# Patient Record
Sex: MALE | Race: WHITE | NOT HISPANIC OR LATINO | Employment: OTHER | URBAN - METROPOLITAN AREA
[De-identification: names, ages, dates, MRNs, and addresses within clinical notes are randomized per-mention and may not be internally consistent; named-entity substitution may affect disease eponyms.]

---

## 2018-09-08 ENCOUNTER — OFFICE VISIT (OUTPATIENT)
Dept: URGENT CARE | Facility: CLINIC | Age: 63
End: 2018-09-08
Payer: COMMERCIAL

## 2018-09-08 VITALS
WEIGHT: 206 LBS | TEMPERATURE: 97.8 F | BODY MASS INDEX: 27.9 KG/M2 | RESPIRATION RATE: 14 BRPM | HEIGHT: 72 IN | DIASTOLIC BLOOD PRESSURE: 80 MMHG | HEART RATE: 68 BPM | SYSTOLIC BLOOD PRESSURE: 128 MMHG | OXYGEN SATURATION: 98 %

## 2018-09-08 DIAGNOSIS — M62.838 NECK MUSCLE SPASM: Primary | ICD-10-CM

## 2018-09-08 PROBLEM — C80.1 CARCINOMA OF UNKNOWN PRIMARY (HCC): Status: ACTIVE | Noted: 2017-01-03

## 2018-09-08 PROBLEM — N40.0 ENLARGED PROSTATE: Status: ACTIVE | Noted: 2017-04-20

## 2018-09-08 PROBLEM — C77.0 SECONDARY MALIGNANT NEOPLASM OF LYMPH NODES OF NECK (HCC): Status: ACTIVE | Noted: 2017-01-03

## 2018-09-08 PROCEDURE — 99204 OFFICE O/P NEW MOD 45 MIN: CPT | Performed by: PHYSICIAN ASSISTANT

## 2018-09-08 RX ORDER — CYCLOBENZAPRINE HCL 5 MG
5-10 TABLET ORAL 3 TIMES DAILY PRN
Qty: 15 TAB | Refills: 0 | Status: SHIPPED | OUTPATIENT
Start: 2018-09-08 | End: 2018-09-13

## 2018-09-08 RX ORDER — METHYLPREDNISOLONE 4 MG/1
4 TABLET ORAL DAILY
Qty: 1 KIT | Refills: 0 | Status: SHIPPED | OUTPATIENT
Start: 2018-09-08 | End: 2018-09-25

## 2018-09-08 ASSESSMENT — ENCOUNTER SYMPTOMS
NAUSEA: 0
SHORTNESS OF BREATH: 0
FEVER: 0
CONSTIPATION: 0
COUGH: 0
DIARRHEA: 0
TINGLING: 0
NECK PAIN: 1
ABDOMINAL PAIN: 0
VOMITING: 0
CHILLS: 0

## 2018-09-08 NOTE — PROGRESS NOTES
Subjective:   Cesar Bernard is a 63 y.o. male who presents for Neck Pain (x2wks radiates to shoulder )    Neck Pain    This is a new problem. Episode onset: 2 weeks. The problem occurs constantly. The problem has been gradually worsening. The pain is present in the midline. The quality of the pain is described as shooting. Pertinent negatives include no fever or tingling. Treatments tried: prednisone 15mg QD x 3 (dogs rx) The treatment provided moderate relief.     Denies specific injury to neck but he is currently moving. He has been doing a lot of lifting and moving of boxes.    Pt had radiation for oropharynx cancer  in 2016, without complication.     Review of Systems   Constitutional: Negative for chills, fever and malaise/fatigue.   Respiratory: Negative for cough and shortness of breath.    Gastrointestinal: Negative for abdominal pain, constipation, diarrhea, nausea and vomiting.   Musculoskeletal: Positive for neck pain.   Neurological: Negative for tingling.   All other systems reviewed and are negative.      PMH:  has no past medical history on file.  MEDS:   Current Outpatient Prescriptions:   •  MethylPREDNISolone (MEDROL DOSEPAK) 4 MG Tablet Therapy Pack, Take 1 Tab by mouth every day., Disp: 1 Kit, Rfl: 0  •  cyclobenzaprine (FLEXERIL) 5 MG tablet, Take 1-2 Tabs by mouth 3 times a day as needed for up to 5 days., Disp: 15 Tab, Rfl: 0  ALLERGIES: No Known Allergies  SURGHX: No past surgical history on file.  SOCHX:  reports that he has never smoked. He has never used smokeless tobacco.  No family history on file.     Objective:   /80   Pulse 68   Temp 36.6 °C (97.8 °F)   Resp 14   Ht 1.829 m (6')   Wt 93.4 kg (206 lb)   SpO2 98%   BMI 27.94 kg/m²     Physical Exam   Constitutional: He is oriented to person, place, and time. He appears well-developed and well-nourished. No distress.   HENT:   Head: Normocephalic and atraumatic.   Eyes: Pupils are equal, round, and reactive to light.  Conjunctivae are normal.   Cardiovascular: Normal rate and regular rhythm.    Pulmonary/Chest: Effort normal and breath sounds normal.   Musculoskeletal:        Cervical back: He exhibits pain and spasm. He exhibits normal range of motion, no bony tenderness, no swelling, no edema and no deformity.   ROM, strength, tone intact bilateral upper extremity. N/v intact.    Neurological: He is alert and oriented to person, place, and time.   Skin: Skin is warm and dry.   Psychiatric: He has a normal mood and affect. His behavior is normal.   Vitals reviewed.        Assessment/Plan:     1. Neck muscle spasm  MethylPREDNISolone (MEDROL DOSEPAK) 4 MG Tablet Therapy Pack    cyclobenzaprine (FLEXERIL) 5 MG tablet    REFERRAL TO FOLLOW-UP WITH PRIMARY CARE       Pt directed to start neck stretches, handout provided. Continue supportive care measures. Given pt past medical hx pt directed to follow-up with primary care provider within 7-10 days, referral initiated. If sx worsen or persist consider PT. Red flags and emergency room precautions discussed.  Patient appears understanding of information.

## 2018-09-08 NOTE — PATIENT INSTRUCTIONS
Cervical Strain and Sprain Rehab  Ask your health care provider which exercises are safe for you. Do exercises exactly as told by your health care provider and adjust them as directed. It is normal to feel mild stretching, pulling, tightness, or discomfort as you do these exercises, but you should stop right away if you feel sudden pain or your pain gets worse. Do not begin these exercises until told by your health care provider.  Stretching and range of motion exercises  These exercises warm up your muscles and joints and improve the movement and flexibility of your neck. These exercises also help to relieve pain, numbness, and tingling.  Exercise A: Cervical side bend  1. Using good posture, sit on a stable chair or stand up.  2. Without moving your shoulders, slowly tilt your left / right ear to your shoulder until you feel a stretch in your neck muscles. You should be looking straight ahead.  3. Hold for __________ seconds.  4. Repeat with the other side of your neck.  Repeat __________ times. Complete this exercise __________ times a day.  Exercise B: Cervical rotation  1. Using good posture, sit on a stable chair or stand up.  2. Slowly turn your head to the side as if you are looking over your left / right shoulder.  ¨ Keep your eyes level with the ground.  ¨ Stop when you feel a stretch along the side and the back of your neck.  3. Hold for __________ seconds.  4. Repeat this by turning to your other side.  Repeat __________ times. Complete this exercise __________ times a day.  Exercise C: Thoracic extension and pectoral stretch  1. Roll a towel or a small blanket so it is about 4 inches (10 cm) in diameter.  2. Lie down on your back on a firm surface.  3. Put the towel lengthwise, under your spine in the middle of your back. It should not be not under your shoulder blades. The towel should line up with your spine from your middle back to your lower back.  4. Put your hands behind your head and let your  elbows fall out to your sides.  5. Hold for __________ seconds.  Repeat __________ times. Complete this exercise __________ times a day.  Strengthening exercises  These exercises build strength and endurance in your neck. Endurance is the ability to use your muscles for a long time, even after your muscles get tired.  Exercise D: Upper cervical flexion, isometric  1. Lie on your back with a thin pillow behind your head and a small rolled-up towel under your neck.  2. Gently tuck your chin toward your chest and nod your head down to look toward your feet. Do not lift your head off the pillow.  3. Hold for __________ seconds.  4. Release the tension slowly. Relax your neck muscles completely before you repeat this exercise.  Repeat __________ times. Complete this exercise __________ times a day.  Exercise E: Cervical extension, isometric  1. Stand about 6 inches (15 cm) away from a wall, with your back facing the wall.  2. Place a soft object, about 6-8 inches (15-20 cm) in diameter, between the back of your head and the wall. A soft object could be a small pillow, a ball, or a folded towel.  3. Gently tilt your head back and press into the soft object. Keep your jaw and forehead relaxed.  4. Hold for __________ seconds.  5. Release the tension slowly. Relax your neck muscles completely before you repeat this exercise.  Repeat __________ times. Complete this exercise __________ times a day.  Posture and body mechanics     Body mechanics refers to the movements and positions of your body while you do your daily activities. Posture is part of body mechanics. Good posture and healthy body mechanics can help to relieve stress in your body's tissues and joints. Good posture means that your spine is in its natural S-curve position (your spine is neutral), your shoulders are pulled back slightly, and your head is not tipped forward. The following are general guidelines for applying improved posture and body mechanics to your  everyday activities.  Standing  · When standing, keep your spine neutral and keep your feet about hip-width apart. Keep a slight bend in your knees. Your ears, shoulders, and hips should line up.  · When you do a task in which you  one place for a long time, place one foot up on a stable object that is 2-4 inches (5-10 cm) high, such as a footstool. This helps keep your spine neutral.  Sitting  · When sitting, keep your spine neutral and your keep feet flat on the floor. Use a footrest, if necessary, and keep your thighs parallel to the floor. Avoid rounding your shoulders, and avoid tilting your head forward.  · When working at a desk or a computer, keep your desk at a height where your hands are slightly lower than your elbows. Slide your chair under your desk so you are close enough to maintain good posture.  · When working at a computer, place your monitor at a height where you are looking straight ahead and you do not have to tilt your head forward or downward to look at the screen.  Resting  When lying down and resting, avoid positions that are most painful for you. Try to support your neck in a neutral position. You can use a contour pillow or a small rolled-up towel. Your pillow should support your neck but not push on it.  This information is not intended to replace advice given to you by your health care provider. Make sure you discuss any questions you have with your health care provider.  Document Released: 12/18/2006 Document Revised: 08/24/2017 Document Reviewed: 11/23/2016  [employer] Interactive Patient Education © 2017 [employer]

## 2018-09-25 ENCOUNTER — OFFICE VISIT (OUTPATIENT)
Dept: INTERNAL MEDICINE | Facility: MEDICAL CENTER | Age: 63
End: 2018-09-25
Payer: COMMERCIAL

## 2018-09-25 ENCOUNTER — HOSPITAL ENCOUNTER (OUTPATIENT)
Dept: RADIOLOGY | Facility: MEDICAL CENTER | Age: 63
End: 2018-09-25
Attending: STUDENT IN AN ORGANIZED HEALTH CARE EDUCATION/TRAINING PROGRAM
Payer: COMMERCIAL

## 2018-09-25 VITALS
HEIGHT: 72 IN | TEMPERATURE: 97.5 F | OXYGEN SATURATION: 98 % | DIASTOLIC BLOOD PRESSURE: 86 MMHG | SYSTOLIC BLOOD PRESSURE: 130 MMHG | HEART RATE: 59 BPM | WEIGHT: 203 LBS | BODY MASS INDEX: 27.5 KG/M2

## 2018-09-25 DIAGNOSIS — M54.2 NECK PAIN: ICD-10-CM

## 2018-09-25 DIAGNOSIS — Z00.00 HEALTH CARE MAINTENANCE: ICD-10-CM

## 2018-09-25 PROCEDURE — 99204 OFFICE O/P NEW MOD 45 MIN: CPT | Mod: GC | Performed by: INTERNAL MEDICINE

## 2018-09-25 PROCEDURE — 72040 X-RAY EXAM NECK SPINE 2-3 VW: CPT

## 2018-09-25 RX ORDER — TIZANIDINE 4 MG/1
4 TABLET ORAL
Qty: 90 TAB | Refills: 0 | Status: SHIPPED | OUTPATIENT
Start: 2018-09-25 | End: 2018-10-23

## 2018-09-25 RX ORDER — GABAPENTIN 300 MG/1
300 CAPSULE ORAL
Qty: 90 CAP | Refills: 0 | Status: SHIPPED | OUTPATIENT
Start: 2018-09-25 | End: 2018-10-23

## 2018-09-25 RX ORDER — PREDNISONE 20 MG/1
20 TABLET ORAL DAILY
Qty: 5 TAB | Refills: 0 | Status: CANCELLED | OUTPATIENT
Start: 2018-09-25

## 2018-09-25 ASSESSMENT — PATIENT HEALTH QUESTIONNAIRE - PHQ9: CLINICAL INTERPRETATION OF PHQ2 SCORE: 0

## 2018-10-01 ENCOUNTER — PHYSICAL THERAPY (OUTPATIENT)
Dept: PHYSICAL THERAPY | Facility: MEDICAL CENTER | Age: 63
End: 2018-10-01
Attending: STUDENT IN AN ORGANIZED HEALTH CARE EDUCATION/TRAINING PROGRAM
Payer: COMMERCIAL

## 2018-10-01 DIAGNOSIS — M54.2 NECK PAIN: ICD-10-CM

## 2018-10-01 PROCEDURE — 97161 PT EVAL LOW COMPLEX 20 MIN: CPT

## 2018-10-01 ASSESSMENT — ENCOUNTER SYMPTOMS
PAIN SCALE AT LOWEST: 0
ALLEVIATING FACTORS: POSITION CHANGE
ALLEVIATING FACTORS: PAIN MEDICATION
POSTURAL HEADACHE: 1
PAIN SCALE: 4
QUALITY: SHARP
PAIN TIMING: EVERY DAY
ALLEVIATING FACTORS: ACTIVITY MODIFICATION
ALLEVIATING FACTORS: NECK COLLAR
PAIN TIMING: INTERMITTENT
EXACERBATED BY: KEYBOARDING
PAIN TIMING: OCCASIONAL

## 2018-10-01 NOTE — OP THERAPY EVALUATION
Outpatient Physical Therapy  INITIAL EVALUATION    [facility] Outpatient Physical Therapy  [address]  [place] [address]  Phone:  [phone]  Fax:  [phone]    Date of Evaluation: 10/01/2018    Patient: Cesar Bernard  YOB: 1955  MRN: [id number]     Referring Provider: Carey Hoyt M.D.  [address] [place] [address], [place] [address]   Referring Diagnosis No admission diagnoses are documented for this encounter.     Time Calculation  Start time: 1415 (patient arrived late due to difficulty finding clinic)  Stop time: 1500 Time Calculation (min): 45 minutes     Physical Therapy Occurrence Codes    Date of onset of impairment:  9/3/18   Date physical therapy care plan established or reviewed:  10/1/18   Date physical therapy treatment started:  10/1/18          Chief Complaint: Neck Pain    Visit Diagnoses     ICD-10-CM   1. Neck pain M54.2         Subjective:   History of Present Illness:     Mechanism of injury:  Patient with h/o oropharynx cancer  in 2016 treated with radiation presents with 3-4 week history of R sided neck pain with radiation down neck to shoulder blade and at times accompanied by headaches. He attributes the onset to lifting boxes during his recent move to [place]. Denies n/t. Denies changes to bowel/bladder function.          Headaches:  postural headache  Headache comments: radiates up from neck/suboccipitals region  Sleep disturbance:  Not disrupted (not currently)  Pain:     Current pain ratin    At best pain ratin (in the morning upon waking)    Location:  R side neck from base of skull. Radiates at times to R upper back at level of inferior angle R scapula    Quality:  Sharp    Pain timing:  Intermittent, every day and occasional    Relieving factors:  Pain medication, neck collar, activity modification and position change (sitting in spa)    Aggravating factors:  Keyboarding (looking down to keyboard, to turn on  in the  morning)    Progression:  Improving    Pain Comments::  At onset, was extremely painful. The patient visited urgent care several weeks ago. Much improved currently, although symptoms are still occurring daily.  Social Support:     Lives in:  One-story house    Lives with:  Spouse  Hand dominance:  Right  Diagnostic Tests:     X-ray: normal      Abnormal MRI: The patient is scheduled for his quarterly full body follow up MRI in 2 weeks.      Diagnostic Tests Comments:  X-ray C-spine 9/25/2018  Impression   No acute fracture or malalignment.          Treatments:     Previous treatment:  Medication    Current treatment:  Cervical collar, activity modification and medication (advil)    Treatment Comments:  Cervical collar worn less frequently as symptoms have improved. Not worn to today's evaluation.      Activities of Daily Living:     Patient reported ADL status: Retired   Does spin class regularly, elliptical, walking, golfing    The patient continues to  perform neck and jaw ROM exercises at home that he was prescribed by PT at the time of his initial treatment for oropharynx cancer.  Patient Goals:     Patient goals for therapy:  Decreased pain      No past medical history on file.  No past surgical history on file.  Social History   Substance Use Topics   • Smoking status: Never Smoker   • Smokeless tobacco: Former User     Types: Chew   • Alcohol use Not on file     Family and Occupational History     Social History   • Marital status:      Spouse name: N/A   • Number of children: N/A   • Years of education: N/A       Objective     Postural Observations  Seated posture: fair  Standing posture: fair  Correction of posture: makes symptoms worse    Additional Postural Observation Details  Forward head, slightly elevated L shoulder, protracted scapula bilaterally    Shoulder Screen    Shoulder active range of motion within functional limits.  Shoulder strength within functional  limits.  Shoulder joint mobility within functional limits.    Neurological Testing     Reflexes   Left   Biceps (C5/C6): normal (2+)  Brachioradialis (C6): normal (2+)  Triceps (C7): normal (2+)    Right   Biceps (C5/C6): normal (2+)  Brachioradialis (C6): normal (2+)  Triceps (C7): normal (2+)    Myotome testing   Cervical (left)   All left cervical myotomes within normal limits    Cervical (right)   All right cervical myotomes within normal limits    Dermatome testing   Cervical (left)   All left cervical dermatomes intact    Cervical (right)   All right cervical dermatomes intact    Palpation   Left   No palpable tenderness to the cervical paraspinals.     Right   Tenderness of the cervical paraspinals.     Active Range of Motion     Cervical Spine   Flexion: decreased  Extension: decreased  Retraction: within functional limits (provokes symptoms)  Left lateral flexion: decreased  Right lateral flexion: decreased  Left rotation: within functional limits  Right rotation: within functional limits    Passive Range of Motion     Cervical spine: All cervical passive range of motion within functional limits    Joint Play   Spine     Central PA [place]        C0-1:  with grade 3       C2: WFL with grade 3       C3: WFL with grade 3       C4: WFL with grade 3       C5: WFL with grade 3       C6: WFL with grade 3       C7: WFL with grade 3       C8: WFL with grade 3    Unilateral PA Glide (left)        C2: WFL       C3: WFL       C4: WFL       C5: WFL       C6: WFL       C7: WFL       C8: WFL    Unilateral PA Glide (right)        C2: WFL       C3: WFL       C4: WFL       C5: WFL       C6: WFL       C7: WFL       C8: WFL        Strength:    Cervical Spine   Deep neck flexors: 3+  Deep neck extensors: 3+    Upper extremities   Left upper extremity strength within functional limits  Right upper extremity strength within functional limits    Left Shoulder   Isolated Muscles   Rhomboids: 5   Serratus anterior: 4- (resisted  testing provoked R sided neck pain)   Trapezius (lower): 3-   Trapezius (middle): 4- (Mid and upper trapezius testing provoked thoracic paraspinal fatigue/mild cramping)     Right Shoulder   Isolated Muscles   Rhomboids: 5   Serratus anterior: 4- (resisted testing provoked R sided neck pain)   Trapezius (lower): 3-   Trapezius (middle): 4- (Mid and upper trapezius testing provoked thoracic paraspinal fatigue/mild cramping)     Tests   Cervical spine   Negative cervical spine compression and cervical spine distraction.   Additional testing details: Negative cervical quadrant all directions    Left Shoulder   Negative Spurling's sign.     Right Shoulder   Negative Spurling's sign.         Therapeutic Exercises (CPT 04807):     1. Supine chin tucks to towel roll, 10 with 5 sec. hold, 10% force, 2 sets/day    2. Supine suboccipital stretch on tennis balls in sock, 2-5 min, 1-2 x per day      Therapeutic Exercise Summary: Fixational Access Code: [email or web address]           Time-based treatments/modalities:  Therapeutic exercise minutes (CPT 79900): 5 minutes       Assessment, Response and Plan:   Impairments: abnormal or restricted ROM, lacks appropriate home exercise program and pain with function    Other Impairments:  Impaired posture  Assessment details:  63 year old male with history notable for oropharynx cancer in 2016 treated with radiation presents with 4 week history of intermittent R posterior neck pain. Signs and symptoms are consistent with myofascial derangement of R upper trapezius, cervical paraspinals, suboccipitals in the setting of impaired posture and likely provoked by poor body mechanics with repetitive strain while lifting boxes during his recent move. All joint provocation tests at this exam were negative and neurological testing WNL. The patient will benefit from skilled PT to address the above impairments and restore baseline function. Expect that PT goals will be met in 8 weeks.  Barriers to therapy:   None  Prognosis: good    Goals:   Short Term Goals:   Able to perform cervical retraction/scapular depression in sitting posture without provocation of symptoms.   No pain with cervical spine AROM  Pain 0/10 with ADLs    Short term goal time span:  2-4 weeks      Long Term Goals:    NDI score: 0% impaired  Improved strength/endurance cervical/axial flexors and extensors  Pain 0/10 with recreation, reading  Long term goal time span:  6-8 weeks    Plan:   Therapy options:  Physical therapy treatment to continue  Planned therapy interventions:  Hot or Cold Pack Therapy (CPT 46572), Mechanical Traction (CPT 23809), Neuromuscular Re-education (CPT 45226), Therapeutic Activities (CPT 84081) and Therapeutic Exercise (CPT 72351)  Frequency:  2x week  Duration in weeks:  8  Discussed with:  Patient      Functional Limitation G-Codes and Severity Modifiers  Neck Disability Total: 13     Referring provider co-signature:  I have reviewed this plan of care and my co-signature certifies the need for services.  Certification Dates:   From 10/1/2018     To 11/30/2018    Physician Signature: ________________________________ Date: ______________

## 2018-10-05 ENCOUNTER — PHYSICAL THERAPY (OUTPATIENT)
Dept: PHYSICAL THERAPY | Facility: MEDICAL CENTER | Age: 63
End: 2018-10-05
Attending: STUDENT IN AN ORGANIZED HEALTH CARE EDUCATION/TRAINING PROGRAM
Payer: COMMERCIAL

## 2018-10-05 DIAGNOSIS — M54.2 NECK PAIN: ICD-10-CM

## 2018-10-05 PROCEDURE — 97110 THERAPEUTIC EXERCISES: CPT

## 2018-10-05 NOTE — OP THERAPY DAILY TREATMENT
Outpatient Physical Therapy  DAILY TREATMENT     [facility] Outpatient Physical Therapy  [address]  [place] [address]  Phone:  [phone]  Fax:  [phone]    Date: 10/05/2018    Patient: Cesar Bernard  YOB: 1955  MRN: [id number]     Time Calculation  Start time: 0300  Stop time: 0330 Time Calculation (min): 30 minutes     Chief Complaint: Neck Problem    Visit #: 2    SUBJECTIVE:  Symptoms have been less irritable. The tennis ball stretch gets rid of symptoms when he has a flare up.     OBJECTIVE:  Current objective measures:           Therapeutic Exercises (CPT 12729):     1. UBE 2 min fwd, 2 min bckwd    2. Supine cervical retraction to towel roll, 10 x 2    3. Supine chin tuck with cervical/axial flexion, 5 x 2    4. Unilateral horizontal UE abduction, [place] band, 10 ea. side x 1    5. Seated scapular adduction/retraction , 10 with 5 sec. hold x 1      Therapeutic Exercise Summary:           Time-based treatments/modalities:  Therapeutic exercise minutes (CPT 28128): 30 minutes       ASSESSMENT:   Response to treatment: Overuse of upper trapezius with UBE. Reported stretch in suboccipitals with cervical axial flexion exercise at today's session, but no flare up of symptoms.     PLAN/RECOMMENDATIONS:   Plan for treatment: therapy treatment to continue next visit.  Planned interventions for next visit: continue with current treatment.

## 2018-10-10 ENCOUNTER — PHYSICAL THERAPY (OUTPATIENT)
Dept: PHYSICAL THERAPY | Facility: MEDICAL CENTER | Age: 63
End: 2018-10-10
Attending: STUDENT IN AN ORGANIZED HEALTH CARE EDUCATION/TRAINING PROGRAM
Payer: COMMERCIAL

## 2018-10-10 DIAGNOSIS — M54.2 NECK PAIN: ICD-10-CM

## 2018-10-10 PROCEDURE — 97012 MECHANICAL TRACTION THERAPY: CPT

## 2018-10-12 ENCOUNTER — PHYSICAL THERAPY (OUTPATIENT)
Dept: PHYSICAL THERAPY | Facility: MEDICAL CENTER | Age: 63
End: 2018-10-12
Attending: STUDENT IN AN ORGANIZED HEALTH CARE EDUCATION/TRAINING PROGRAM
Payer: COMMERCIAL

## 2018-10-12 DIAGNOSIS — M54.2 NECK PAIN: ICD-10-CM

## 2018-10-12 PROCEDURE — 97140 MANUAL THERAPY 1/> REGIONS: CPT

## 2018-10-12 PROCEDURE — 97530 THERAPEUTIC ACTIVITIES: CPT

## 2018-10-12 NOTE — OP THERAPY DAILY TREATMENT
Outpatient Physical Therapy  DAILY TREATMENT     [facility] Outpatient Physical Therapy  [address]  [place] [address]  Phone:  [phone]  Fax:  [phone]    Date: 10/12/2018    Patient: Cesar Bernard  YOB: 1955  MRN: [id number]     Time Calculation  Start time: 1030  Stop time: 1100 Time Calculation (min): 30 minutes     Chief Complaint: Neck Problem    Visit #: 4    SUBJECTIVE:  Symptom provocation primarily when cooking and when carrying a heavy shopping basket in R hand.     OBJECTIVE:  Current objective measures          Therapeutic Exercises (CPT 24108):       Therapeutic Exercise Summary:         Therapeutic Treatments and Modalities:     1. Manual Therapy (CPT 83921), Suboccipital release, PROM Cervical ROT, Lateral flexion R, STM R upper trapezius, cervical paraspinals, AAROM Chin retraction , 15 min    2. Therapeutic Activities (CPT 56697), Functional training - ergonomics and body mechanics for cooking, chopping set-up, 15 min    Time-based treatments/modalities:  Manual therapy minutes (CPT 34834): 15 minutes  Therapeutic activity minutes (CPT 23260): 15 minutes     Patient education: Patient instructed to trial more ergonomic setup for cooking, as practiced in clinic today.    Pain rating before treatment: 0  Pain rating after treatment: 0    ASSESSMENT:   Response to treatment: Patient reported mild sensation of tightness in R side neck with repeated PROM lateral flexion, which eased with repetition.    PLAN/RECOMMENDATIONS:   Plan for treatment: therapy treatment to continue next visit.  Planned interventions for next visit: continue with current treatment. Review and progress HEP.

## 2018-10-15 ENCOUNTER — APPOINTMENT (OUTPATIENT)
Dept: PHYSICAL THERAPY | Facility: MEDICAL CENTER | Age: 63
End: 2018-10-15
Attending: STUDENT IN AN ORGANIZED HEALTH CARE EDUCATION/TRAINING PROGRAM
Payer: COMMERCIAL

## 2018-10-19 ENCOUNTER — PHYSICAL THERAPY (OUTPATIENT)
Dept: PHYSICAL THERAPY | Facility: MEDICAL CENTER | Age: 63
End: 2018-10-19
Attending: STUDENT IN AN ORGANIZED HEALTH CARE EDUCATION/TRAINING PROGRAM
Payer: COMMERCIAL

## 2018-10-19 DIAGNOSIS — M54.2 NECK PAIN: ICD-10-CM

## 2018-10-19 PROCEDURE — 97110 THERAPEUTIC EXERCISES: CPT

## 2018-10-19 NOTE — OP THERAPY DAILY TREATMENT
Outpatient Physical Therapy  DAILY TREATMENT     [facility] Outpatient Physical Therapy  [address]  [place] [address]  Phone:  [phone]  Fax:  [phone]    Date: 10/19/2018    Patient: Cesar Bernard  YOB: 1955  MRN: [id number]     Time Calculation  Start time: 1405  Stop time: 1435 Time Calculation (min): 30 minutes     Chief Complaint: No chief complaint on file.    Visit #: 5    SUBJECTIVE:  The patient reports that he had an cervical spine MRI during his recent full body scan. He reports disc bulge at C6-7. Overall, feels that symptoms have improved since 3-4 weeks ago. No longer needs advil, pain levels are lower. Worst pain in last week 4-5/10.   OBJECTIVE:  Current objective measures:           Therapeutic Exercises (CPT 18071):     1. On foam roller, Prayer/ER shoulder stretch/AROM, Flashers with pink band, Bilateral flexion in neutral abduction, Alternating flexion, [place]    6. At [place], [place], HEP      Time-based treatments/modalities:  Therapeutic exercise minutes (CPT 49570): 30 minutes       Pain rating before treatment: 0  Pain rating after treatment: 0    ASSESSMENT:   Response to treatment: Improving ability to engage periscapular musculature appropriately, without overuse of upper trapezius. No symptom provocation with treatment.     PLAN/RECOMMENDATIONS:   Plan for treatment: therapy treatment to continue next visit.  Planned interventions for next visit: continue with current treatment. Discharge at next visit. Patient leaving for 2 months to travel. Foam roller HEP.

## 2018-10-23 ENCOUNTER — OFFICE VISIT (OUTPATIENT)
Dept: INTERNAL MEDICINE | Facility: MEDICAL CENTER | Age: 63
End: 2018-10-23
Payer: COMMERCIAL

## 2018-10-23 VITALS
OXYGEN SATURATION: 98 % | BODY MASS INDEX: 27.77 KG/M2 | TEMPERATURE: 97.8 F | SYSTOLIC BLOOD PRESSURE: 132 MMHG | WEIGHT: 205 LBS | DIASTOLIC BLOOD PRESSURE: 80 MMHG | HEIGHT: 72 IN | HEART RATE: 70 BPM

## 2018-10-23 DIAGNOSIS — E03.9 HYPOTHYROIDISM, UNSPECIFIED TYPE: ICD-10-CM

## 2018-10-23 DIAGNOSIS — Z71.84 TRAVEL ADVICE ENCOUNTER: ICD-10-CM

## 2018-10-23 PROCEDURE — 99213 OFFICE O/P EST LOW 20 MIN: CPT | Mod: GE | Performed by: INTERNAL MEDICINE

## 2018-10-23 RX ORDER — IMIQUIMOD 37.5 MG/G
CREAM TOPICAL
Refills: 4 | COMMUNITY
Start: 2018-10-16

## 2018-10-23 RX ORDER — AZITHROMYCIN 250 MG/1
TABLET, FILM COATED ORAL
Qty: 6 TAB | Refills: 0 | Status: SHIPPED | OUTPATIENT
Start: 2018-10-23 | End: 2019-04-23

## 2018-10-23 NOTE — PROGRESS NOTES
Established Patient    Cesar presents today with the following:    CC:   Chief Complaint   Patient presents with   • Labs Only     Lab Work Review       HPI:   The patient is a 63 year old male with past medical history of oropharyngeal cancer status post chemoradiation therapy, neck pain presented to the clinic for follow-up visit.  -He reports he recently went to [facility] for regular follow-up with his ENT and had a CAT scan, MRI which showed he is in remission.  He also had TSH testing which was abnormal-5.16 and was concerned as he underwent radiation to his neck  -He denies any symptoms of thyroid dysfunction: Heat or cold intolerance, constipation or diarrhea, hair loss, dry skin.  He reports he has symptoms of fatigue, low energy and falls asleep early.  -He also stated he went to 5 sessions of physical therapy to his neck and reports near complete resolution of symptoms.  He is also doing those exercises at home.  -He denies any use of tizanidine or gabapentin for similar pain anymore.  -He is traveling to [place] next month and would return in January, is going for vacation.    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/2017   • Cancer associated pain 03/01/2016   • Fever and other physiologic disturbances of temperature regulation 03/01/2016   • History of ongoing treatment with high-risk medication 02/25/2016   • Skin changes related to chemotherapy 02/25/2016   • Oropharynx cancer (HCC) 01/28/2016   • Hearing loss 01/28/2016   • Pulmonary nodules 01/28/2016   • Renal cyst 01/28/2016   • Tobacco chew use 01/28/2016   • Cancer of base of tongue (HCC) 12/28/2015   • 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        ROS: As per HPI. Additional pertinent symptoms as noted below.    All others negative    /80 (BP Location: Right arm, Patient Position: Sitting, BP Cuff Size: Adult)   Pulse 70   Temp 36.6 °C (97.8 °F) (Temporal)   Ht 1.816 m (5' 11.5\")   Wt 93 kg (205 lb)   SpO2 98%   BMI 28.19 kg/m²     Physical Exam   Constitutional:  oriented to person, place, and time. No distress.   Eyes: Pupils are equal, round, and reactive to light. No scleral icterus.  Neck: Neck supple. No thyromegaly present.   Cardiovascular: Normal rate, regular rhythm and normal heart sounds.  Exam reveals no gallop and no friction rub.  No murmur heard.  Pulmonary/Chest: Breath sounds normal. Chest wall is not dull to percussion.   Musculoskeletal:   no edema.   Lymphadenopathy: no cervical adenopathy  Neurological: alert and oriented to person, place, and time.   Skin: No cyanosis. Nails show no clubbing.    Note: I have reviewed all pertinent labs and diagnostic tests associated with this visit with specific comments listed under the assessment and plan below    Assessment and Plan    1. Hypothyroidism, unspecified type  -Patient underwent CT/MRI of the neck at [facility] recently for follow up of his oral cancer post treatment, at that time he also had TSH done which was 5.16 (nl: up to 4)  -Denies any symptoms of thyroid dysfunction  -On exam : no thyromegaly.  -Patient is advised to get repeat labs in 4-6 weeks -TSH ,FT4 and will consider medications if TSH is more than 10 or has thyroid symptoms.    2. Travel advice encounter  -Patient is travelling to [place] for three months from November 3 2018- January 2019  -Patient is up to date with vaccinations  -Azithromycin for traveller's diarrhea is given to the patient.  -Patient is also advised to use ORT " packets if symptoms of diarrhea develop during the travel.      Followup: Return in about 4 months (around 2/23/2019).      Signed by: Carey Hoyt M.D.

## 2018-10-29 RX ORDER — EPINEPHRINE 0.3 MG/.3ML
0.3 INJECTION SUBCUTANEOUS ONCE
Qty: 0.3 ML | Refills: 0 | Status: SHIPPED | OUTPATIENT
Start: 2018-10-29 | End: 2018-10-29

## 2019-04-19 ENCOUNTER — TELEPHONE (OUTPATIENT)
Dept: MEDICAL GROUP | Facility: LAB | Age: 64
End: 2019-04-19

## 2019-04-19 NOTE — TELEPHONE ENCOUNTER
NEW PATIENT VISIT PRE-VISIT PLANNING    1.  E[employer] Patient is checked in Patient Demographics? YES    2.  Immunizations were updated in Epic using WebIZ?: No WebIZ record       •  Web Iz Recommendations:     3.  Is this appointment scheduled as a Hospital Follow-Up? No    4.  Patient is due for the following Health Maintenance Topics:   Health Maintenance Due   Topic Date Due   • COLONOSCOPY  07/28/2007   • IMM ZOSTER VACCINES (1 of 2) 07/28/2007           5. Orders for overdue Health Maintenance topics pended in Pre-Charting? NO    6.  Reviewed/Updated the following with patient:   •   Preferred Pharmacy? NO       •   Preferred Lab? NO       •   Preferred Communication? NO       •   Allergies? NO       •   Medications? NO       •   Social History? NO       •   Family History (document living status of immediate family members and if + hx of cancer, diabetes, hypertension, hyperlipidemia, heart attack, stroke) NO    7.  Updated Care Team?       •   DME Company (gait device, O2, CPAP, etc.) NO       •   Other Specialists (eye doctor, derm, GYN, cardiology, endo, etc): NO    8.  Patient was informed to arrive 15 min prior to their   scheduled appointment and bring in their medication bottles.

## 2019-04-23 ENCOUNTER — OFFICE VISIT (OUTPATIENT)
Dept: MEDICAL GROUP | Facility: LAB | Age: 64
End: 2019-04-23
Payer: COMMERCIAL

## 2019-04-23 VITALS
BODY MASS INDEX: 27.63 KG/M2 | OXYGEN SATURATION: 95 % | HEIGHT: 72 IN | RESPIRATION RATE: 20 BRPM | HEART RATE: 66 BPM | WEIGHT: 204 LBS | DIASTOLIC BLOOD PRESSURE: 72 MMHG | SYSTOLIC BLOOD PRESSURE: 116 MMHG | TEMPERATURE: 98.8 F

## 2019-04-23 DIAGNOSIS — N40.0 ENLARGED PROSTATE: ICD-10-CM

## 2019-04-23 DIAGNOSIS — T45.1X5A SKIN CHANGES RELATED TO CHEMOTHERAPY: ICD-10-CM

## 2019-04-23 DIAGNOSIS — H91.93 BILATERAL HEARING LOSS, UNSPECIFIED HEARING LOSS TYPE: ICD-10-CM

## 2019-04-23 DIAGNOSIS — E78.49 OTHER HYPERLIPIDEMIA: ICD-10-CM

## 2019-04-23 DIAGNOSIS — I25.10 CORONARY ARTERY CALCIFICATION OF NATIVE ARTERY: ICD-10-CM

## 2019-04-23 DIAGNOSIS — C77.0 METASTASIS TO HEAD AND NECK LYMPH NODE (HCC): ICD-10-CM

## 2019-04-23 DIAGNOSIS — R91.8 PULMONARY NODULES: ICD-10-CM

## 2019-04-23 DIAGNOSIS — R23.9 SKIN CHANGES RELATED TO CHEMOTHERAPY: ICD-10-CM

## 2019-04-23 DIAGNOSIS — C01 CANCER OF BASE OF TONGUE (HCC): ICD-10-CM

## 2019-04-23 DIAGNOSIS — I25.84 CORONARY ARTERY CALCIFICATION OF NATIVE ARTERY: ICD-10-CM

## 2019-04-23 DIAGNOSIS — D72.819 LEUKOPENIA, UNSPECIFIED TYPE: ICD-10-CM

## 2019-04-23 DIAGNOSIS — C77.0 SECONDARY MALIGNANT NEOPLASM OF LYMPH NODES OF NECK (HCC): ICD-10-CM

## 2019-04-23 DIAGNOSIS — I65.23 BILATERAL CAROTID ARTERY STENOSIS: ICD-10-CM

## 2019-04-23 PROBLEM — C80.1 CARCINOMA OF UNKNOWN PRIMARY (HCC): Status: RESOLVED | Noted: 2017-01-03 | Resolved: 2019-04-23

## 2019-04-23 PROCEDURE — 99204 OFFICE O/P NEW MOD 45 MIN: CPT | Performed by: FAMILY MEDICINE

## 2019-04-23 RX ORDER — ROSUVASTATIN CALCIUM 5 MG/1
5 TABLET, COATED ORAL DAILY
COMMUNITY
Start: 2019-04-11 | End: 2019-05-29

## 2019-04-23 RX ORDER — EZETIMIBE 10 MG/1
10 TABLET ORAL DAILY
COMMUNITY
Start: 2019-04-15

## 2019-04-23 RX ORDER — LEVOTHYROXINE SODIUM 75 MCG
75 TABLET ORAL
COMMUNITY
Start: 2019-04-01

## 2019-04-23 ASSESSMENT — PATIENT HEALTH QUESTIONNAIRE - PHQ9: CLINICAL INTERPRETATION OF PHQ2 SCORE: 0

## 2019-04-23 NOTE — ASSESSMENT & PLAN NOTE
Seen at [facility] every 6 months.  Diagnosis was 3 years ago. Did radiation and Cetux.  Had some hearing loss from this.  He follows at [facility] every 6 months for this.

## 2019-04-23 NOTE — PATIENT INSTRUCTIONS
Cardiac Nuclear Scanning  A cardiac nuclear scan is used to check your heart for problems, such as the following:  · A portion of the heart is not getting enough blood.  · Part of the heart muscle has , which happens with a heart attack.  · The heart wall is not working normally.    In this test, a radioactive dye (tracer) is injected into your bloodstream. After the tracer has traveled to your heart, a scanning device is used to measure how much of the tracer is absorbed by or distributed to various areas of your heart.  LET YOUR HEALTH CARE PROVIDER KNOW ABOUT:  · Any allergies you have.  · All medicines you are taking, including vitamins, herbs, eye drops, creams, and over-the-counter medicines.  · Previous problems you or members of your family have had with the use of anesthetics.  · Any blood disorders you have.  · Previous surgeries you have had.  · Medical conditions you have.    RISKS AND COMPLICATIONS  Generally, this is a safe procedure. However, as with any procedure, problems can occur. Possible problems include:   · Serious chest pain.  · Rapid heartbeat.  · Sensation of warmth in your chest. This usually passes quickly.  BEFORE THE PROCEDURE  Ask your health care provider about changing or stopping your regular medicines.  PROCEDURE  This procedure is usually done at a hospital and takes 2-4 hours.  · An IV tube is inserted into one of your veins.  · Your health care provider will inject a small amount of radioactive tracer through the tube.  · You will then wait for 20-40 minutes while the tracer travels through your bloodstream.  · You will lie down on an exam table so images of your heart can be taken. Images will be taken for about 15-20 minutes.  · You will exercise on a treadmill or stationary bike. While you exercise, your heart activity will be monitored with an electrocardiogram (ECG), and your blood pressure will be checked.  · If you are unable to exercise, you may be given a medicine  to make your heart beat faster.  · When blood flow to your heart has peaked, tracer will again be injected through the IV tube.  · After 20-40 minutes, you will get back on the exam table and have more images taken of your heart.  · When the procedure is over, your IV tube will be removed.  AFTER THE PROCEDURE  · You will likely be able to leave shortly after the test. Unless your health care provider tells you otherwise, you may return to your normal schedule, including diet, activities, and medicines.  · Make sure you find out how and when you will get your test results.  This information is not intended to replace advice given to you by your health care provider. Make sure you discuss any questions you have with your health care provider.  Document Released: 01/12/2006 Document Revised: 12/23/2014 Document Reviewed: 11/26/2014  [employer] Interactive Patient Education © 2017 [employer]    Hypothyroidism  Hypothyroidism is a disorder of the thyroid. The thyroid is a large gland that is located in the lower front of the neck. The thyroid releases hormones that control how the body works. With hypothyroidism, the thyroid does not make enough of these hormones.  What are the causes?  Causes of hypothyroidism may include:  · Viral infections.  · Pregnancy.  · Your own defense system (immune system) attacking your thyroid.  · Certain medicines.  · Birth defects.  · Past radiation treatments to your head or neck.  · Past treatment with radioactive iodine.  · Past surgical removal of part or all of your thyroid.  · Problems with the gland that is located in the center of your brain (pituitary).  What are the signs or symptoms?  Signs and symptoms of hypothyroidism may include:  · Feeling as though you have no energy (lethargy).  · Inability to tolerate cold.  · Weight gain that is not explained by a change in diet or exercise habits.  · Dry skin.  · Coarse hair.  · Menstrual irregularity.  · Slowing of thought  processes.  · Constipation.  · Sadness or depression.  How is this diagnosed?  Your health care provider may diagnose hypothyroidism with blood tests and ultrasound tests.  How is this treated?  Hypothyroidism is treated with medicine that replaces the hormones that your body does not make. After you begin treatment, it may take several weeks for symptoms to go away.  Follow these instructions at home:  · Take medicines only as directed by your health care provider.  · If you start taking any new medicines, tell your health care provider.  · Keep all follow-up visits as directed by your health care provider. This is important. As your condition improves, your dosage needs may change. You will need to have blood tests regularly so that your health care provider can watch your condition.  Contact a health care provider if:  · Your symptoms do not get better with treatment.  · You are taking thyroid replacement medicine and:  ¨ You sweat excessively.  ¨ You have tremors.  ¨ You feel anxious.  ¨ You lose weight rapidly.  ¨ You cannot tolerate heat.  ¨ You have emotional swings.  ¨ You have diarrhea.  ¨ You feel weak.  Get help right away if:  · You develop chest pain.  · You develop an irregular heartbeat.  · You develop a rapid heartbeat.  This information is not intended to replace advice given to you by your health care provider. Make sure you discuss any questions you have with your health care provider.  Document Released: 12/18/2006 Document Revised: 05/25/2017 Document Reviewed: 05/05/2015  [employer] Interactive Patient Education © 2017 [employer]  Rosuvastatin Tablets  What is this medicine?  ROSUVASTATIN (anais ELINOR va sta tin) is known as a HMG-CoA reductase inhibitor or 'statin'. It lowers cholesterol and triglycerides in the blood. This drug may also reduce the risk of heart attack, stroke, or other health problems in patients with risk factors for heart disease. Diet and lifestyle changes are often used with  this drug.  This medicine may be used for other purposes; ask your health care provider or pharmacist if you have questions.  COMMON BRAND NAME(S): Crestor  What should I tell my health care provider before I take this medicine?  They need to know if you have any of these conditions:  -frequently drink alcoholic beverages  -kidney disease  -liver disease  -muscle aches or weakness  -other medical condition  -an unusual or allergic reaction to rosuvastatin, other medicines, foods, dyes, or preservatives  -pregnant or trying to get pregnant  -breast-feeding  How should I use this medicine?  Take this medicine by mouth with a glass of water. Follow the directions on the prescription label. Do not cut, crush or chew this medicine. You can take this medicine with or without food. Take your doses at regular intervals. Do not take your medicine more often than directed.  Talk to your pediatrician regarding the use of this medicine in children. While this drug may be prescribed for children as young as 7 years old for selected conditions, precautions do apply.  Overdosage: If you think you have taken too much of this medicine contact a poison control center or emergency room at once.  NOTE: This medicine is only for you. Do not share this medicine with others.  What if I miss a dose?  If you miss a dose, take it as soon as you can. Do not take 2 doses within 12 hours of each other. If there are less than 12 hours until your next dose, take only that dose. Do not take double or extra doses.  What may interact with this medicine?  Do not take this medicine with any of the following medications:  -herbal medicines like red yeast rice  This medicine may also interact with the following medications:  -alcohol  -antacids containing aluminum hydroxide or magnesium hydroxide  -cyclosporine  -other medicines for high cholesterol  -some medicines for HIV infection  -warfarin  This list may not describe all possible interactions. Give  your health care provider a list of all the medicines, herbs, non-prescription drugs, or dietary supplements you use. Also tell them if you smoke, drink alcohol, or use illegal drugs. Some items may interact with your medicine.  What should I watch for while using this medicine?  Visit your doctor or health care professional for regular check-ups. You may need regular tests to make sure your liver is working properly.  Tell your doctor or health care professional right away if you get any unexplained muscle pain, tenderness, or weakness, especially if you also have a fever and tiredness. Your doctor or health care professional may tell you to stop taking this medicine if you develop muscle problems. If your muscle problems do not go away after stopping this medicine, contact your health care professional.  This medicine may affect blood sugar levels. If you have diabetes, check with your doctor or health care professional before you change your diet or the dose of your diabetic medicine.  Avoid taking antacids containing aluminum, calcium or magnesium within 2 hours of taking this medicine.  This drug is only part of a total heart-health program. Your doctor or a dietician can suggest a low-cholesterol and low-fat diet to help. Avoid alcohol and smoking, and keep a proper exercise schedule.  Do not use this drug if you are pregnant or breast-feeding. Serious side effects to an unborn child or to an infant are possible. Talk to your doctor or pharmacist for more information.  What side effects may I notice from receiving this medicine?  Side effects that you should report to your doctor or health care professional as soon as possible:  -allergic reactions like skin rash, itching or hives, swelling of the face, lips, or tongue  -dark urine  -fever  -joint pain  -muscle cramps, pain  -redness, blistering, peeling or loosening of the skin, including inside the mouth  -trouble passing urine or change in the amount of  urine  -unusually weak or tired  -yellowing of the eyes or skin  Side effects that usually do not require medical attention (report to your doctor or health care professional if they continue or are bothersome):  -constipation  -heartburn  -nausea  -stomach gas, pain, upset  This list may not describe all possible side effects. Call your doctor for medical advice about side effects. You may report side effects to FDA at 9-730-YQH-7533.  Where should I keep my medicine?  Keep out of the reach of children.  Store at room temperature between 20 and 25 degrees C (68 and 77 degrees F). Keep container tightly closed (protect from moisture). Throw away any unused medicine after the expiration date.  NOTE: This sheet is a summary. It may not cover all possible information. If you have questions about this medicine, talk to your doctor, pharmacist, or health care provider.  © 2018 [employer] (2016-06-02 13:33:08)

## 2019-04-25 NOTE — ASSESSMENT & PLAN NOTE
Patient had a recent carotid artery study at [facility] that showed the following:  Right:  Mild heterogeneous plaque identified within the proximal ICA with no significant stenosis  by velocity criteria (0-50% stenosis).  The vertebral artery has antegrade blood flow.  The subclavian artery is normal.    Left:  Mild heterogeneous plaque identified within the proximal ICA with no significant stenosis  by velocity criteria (0-50% stenosis).  The vertebral artery has antegrade blood flow.  The subclavian artery is normal.

## 2019-04-25 NOTE — ASSESSMENT & PLAN NOTE
CT scan of the coronary arteries showed a coronary calcium score in the LAD of 107 the RCA of 14 for a total of 121.  Patient has not established with a cardiologist in [place] yet.

## 2019-04-25 NOTE — ASSESSMENT & PLAN NOTE
Patient's recent lab test he had a white blood cell count of 2.47 which is lower than it has been.  He denies any recurrent infections.

## 2019-04-25 NOTE — PROGRESS NOTES
Chief Complaint   Patient presents with   • Establish Care         Cesar Bernard is a 63 y.o. male here to establish care and for evaluation and management of:        HPI:    Cancer of base of tongue (HCC)  Seen at [facility] every 6 months.  Diagnosis was 3 years ago. Did radiation and Cetux.  Had some hearing loss from this.  He follows at [facility] every 6 months for this.    Hearing loss  Left greater than right.  Uses hearing aids.    Bilateral carotid artery stenosis  Patient had a recent carotid artery study at [facility] that showed the following:  Right:  Mild heterogeneous plaque identified within the proximal ICA with no significant stenosis  by velocity criteria (0-50% stenosis).  The vertebral artery has antegrade blood flow.  The subclavian artery is normal.    Left:  Mild heterogeneous plaque identified within the proximal ICA with no significant stenosis  by velocity criteria (0-50% stenosis).  The vertebral artery has antegrade blood flow.  The subclavian artery is normal.    Coronary artery calcification of native artery  CT scan of the coronary arteries showed a coronary calcium score in the LAD of 107 the RCA of 14 for a total of 121.  Patient has not established with a cardiologist in [place] yet.    Other hyperlipidemia  Patient was just started on rosuvastatin 5 mg daily and Zetia 10 mg daily by his PCP in [place].  He had blood work at [facility] on 4/1/2019 that showed a total cholesterol of 212 and LDL of 153 and HDL of 39 triglycerides 113.  His Lisa protein B was 128.  LDL-P was 2731 is apo protein A1 was 128.5. LpPLA2 Activity showed increased risk at 238.Genetic testing showed a markedly decreased response to clopidogrel.  He had a single copy of the MTHFR 677(C/T) all and 1298 (A/C) genotype    Pulmonary nodules  Patient has a known pulmonary nodule in the subpleural area of the lingula and right middle lobes.  These have been stable and are being followed by  "[facility].    Leukopenia  Patient's recent lab test he had a white blood cell count of 2.47 which is lower than it has been.  He denies any recurrent infections.      Allergies   Allergen Reactions   • Bee Venom Itching, Rash and Shortness of Breath   • Mangifera Indica Anaphylaxis     Other reaction(s): Other (Specify with Comments)  \"deathly allergic\" per pt   • Pistachio Nut Extract Skin Test Anaphylaxis   • Extract Of Poison Ivy Itching and Rash   • Other Misc Rash     Tumble Weed; itchy       Current medicines (including changes today)  Current Outpatient Prescriptions   Medication Sig Dispense Refill   • SYNTHROID 75 MCG Tab      • ezetimibe (ZETIA) 10 MG Tab      • rosuvastatin (CRESTOR) 5 MG Tab      • ZYCLARA PUMP 3.75 % Cream   4     No current facility-administered medications for this visit.      He  has a past medical history of Allergy; Asthma; Hearing loss; Hyperlipidemia; Thyroid disease; and tongue cancer (2016).  He  has a past surgical history that includes hernia repair; laminotomy (1997); tonsillectomy and adenoidectomy; dupuytren contracture release; and other.  Social History   Substance Use Topics   • Smoking status: Never Smoker   • Smokeless tobacco: Former User     Types: Chew   • Alcohol use 1.8 oz/week     2 Glasses of wine, 1 Shots of liquor per week     Social History     Social History Narrative   • No narrative on file     Family History   Problem Relation Age of Onset   • Psychiatry Mother    • Hyperlipidemia Mother    • Cancer Father         Prostate cancer    • Cancer Paternal Grandfather         Prostate cancer    • Cancer Maternal Aunt         Ovarian cancer   • Colon Cancer Paternal Uncle         At age 50s   • Cancer Paternal Uncle    • Alcohol/Drug Brother      Family Status   Relation Status   • Mo Alive   • Fa    • Bro Alive   • MGMo    • MGFa    • PGMo    • PGFa    • MAunt    • PUnc    • Bro Alive   • Bro Alive " "        ROS  No fever or chills.  No nausea or vomiting.  No chest pain or palpitations.  No cough or SOB.  No pain with urination or hematuria.  No black or bloody stools.  All other systems reviewed and are negative     Objective:     /72 (BP Location: Left arm, Patient Position: Sitting, BP Cuff Size: Adult)   Pulse 66   Temp 37.1 °C (98.8 °F) (Temporal)   Resp 20   Ht 1.816 m (5' 11.5\")   Wt 92.5 kg (204 lb)   SpO2 95%  Body mass index is 28.06 kg/m².  Physical Exam:      Well developed, well nourished.  Alert, oriented in no acute distress.  Psych: Eye contact is good, speech goal directed, affect calm  Eyes: conjunctiva non-injected, sclera non-icteric.  Ears: Pinna normal. TM pearly gray.   Nose: Nares are patent.  Normal mucosa  Mouth: Oral mucous membranes pink and moist with no lesions.  Neck Supple.  No adenopathy or masses in the neck or supraclavicular regions. No thyromegaly  Lungs: clear to auscultation bilaterally with good excursion. No wheezes or rhonchi  CV: regular rate and rhythm. No murmur  Abdomen: soft, nontender, no masses or organomegaly.  No rebound or guarding  Ext: no edema, color normal, vascularity normal, temperature normal      Assessment and Plan:   The following treatment plan was discussed    1. Cancer of base of tongue (HCC)  This is a chronic medical condition that is currently stable  Patient is currently in remission and being followed by [facility].  Continue to monitor    2. 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  Continue to monitor    3. Bilateral hearing loss, unspecified hearing loss type  Patient has hearing aids that he only uses when he is in loud open spaces are where there is a lot of people.  Continue follow-up with audiology    4. Pulmonary nodules  These appear to be stable.  Follow-up with [facility] as scheduled    5. 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  Continue to monitor    6. Skin changes related to chemotherapy  This is a chronic medical condition that is currently stable      7. Coronary artery calcification of native artery  Refer to cardiology for further evaluation and treatment.  - REFERRAL TO CARDIOLOGY    8. Other hyperlipidemia  Continue Crestor and Zetia.  Recommend increasing Crestor if current dose is not effective.  Patient will see cardiology  - REFERRAL TO CARDIOLOGY    9. Enlarged prostate  Recheck PSA  - PROSTATE SPECIFIC AG SCREENING; Future  - TESTOSTERONE SERUM; Future    10. Leukopenia, unspecified type  Recheck CBC  - CBC WITH DIFFERENTIAL; Future    11. Bilateral carotid artery stenosis  Annual follow-up recommended.  Continue Crestor for primary prevention.      Records requested.    Any change or worsening of signs or symptoms, patient encouraged to follow-up or report to the emergency room for further evaluation. Patient understands and agrees.    Followup: Return in about 6 months (around 10/23/2019).

## 2019-04-25 NOTE — ASSESSMENT & PLAN NOTE
Patient was just started on rosuvastatin 5 mg daily and Zetia 10 mg daily by his PCP in [place].  He had blood work at [facility] on 4/1/2019 that showed a total cholesterol of 212 and LDL of 153 and HDL of 39 triglycerides 113.  His Lisa protein B was 128.  LDL-P was 2731 is apo protein A1 was 128.5. LpPLA2 Activity showed increased risk at 238.Genetic testing showed a markedly decreased response to clopidogrel.  He had a single copy of the MTHFR 677(C/T) all and 1298 (A/C) genotype

## 2019-04-30 LAB
BASOPHILS # BLD AUTO: 0 X10E3/UL (ref 0–0.2)
BASOPHILS NFR BLD AUTO: 1 %
EOSINOPHIL # BLD AUTO: 0.1 X10E3/UL (ref 0–0.4)
EOSINOPHIL NFR BLD AUTO: 3 %
ERYTHROCYTE [DISTWIDTH] IN BLOOD BY AUTOMATED COUNT: 14.2 % (ref 12.3–15.4)
HCT VFR BLD AUTO: 46.3 % (ref 37.5–51)
HGB BLD-MCNC: 15.6 G/DL (ref 13–17.7)
IMM GRANULOCYTES # BLD AUTO: 0 X10E3/UL (ref 0–0.1)
IMM GRANULOCYTES NFR BLD AUTO: 0 %
IMMATURE CELLS  115398: NORMAL
LYMPHOCYTES # BLD AUTO: 0.7 X10E3/UL (ref 0.7–3.1)
LYMPHOCYTES NFR BLD AUTO: 21 %
MCH RBC QN AUTO: 31.3 PG (ref 26.6–33)
MCHC RBC AUTO-ENTMCNC: 33.7 G/DL (ref 31.5–35.7)
MCV RBC AUTO: 93 FL (ref 79–97)
MONOCYTES # BLD AUTO: 0.4 X10E3/UL (ref 0.1–0.9)
MONOCYTES NFR BLD AUTO: 10 %
MORPHOLOGY BLD-IMP: NORMAL
NEUTROPHILS # BLD AUTO: 2.2 X10E3/UL (ref 1.4–7)
NEUTROPHILS NFR BLD AUTO: 65 %
NRBC BLD AUTO-RTO: NORMAL %
PLATELET # BLD AUTO: 179 X10E3/UL (ref 150–379)
PSA SERPL-MCNC: 1 NG/ML (ref 0–4)
RBC # BLD AUTO: 4.99 X10E6/UL (ref 4.14–5.8)
TESTOST SERPL-MCNC: 648 NG/DL (ref 264–916)
WBC # BLD AUTO: 3.4 X10E3/UL (ref 3.4–10.8)

## 2019-05-23 ENCOUNTER — TELEPHONE (OUTPATIENT)
Dept: MEDICAL GROUP | Facility: LAB | Age: 64
End: 2019-05-23

## 2019-05-23 NOTE — TELEPHONE ENCOUNTER
----- Message from Cesar Bernard sent at 5/23/2019 10:07 AM PDT -----  Regarding: Non-Urgent Medical Question  Contact: [phone]  Hello,  I wanted to see if I could make an appointment with Dr. Bar as soon as possible.  I am experiencing shortness of breath and continued dry cough for over 4 weeks now and it does not seem to be going away.  Thanks,    Jonah Bernard

## 2019-05-23 NOTE — TELEPHONE ENCOUNTER
Spoke with pt he has had a dry cough/ wheeze for about 4 weeks. He is leaving [place] today and heading back to [place]. I did make him an appt for Tuesday morning. I also explained to pt if the symptoms worsened to seek medical care at the nearest urgent care or ED.

## 2019-05-28 ENCOUNTER — OFFICE VISIT (OUTPATIENT)
Dept: MEDICAL GROUP | Facility: LAB | Age: 64
End: 2019-05-28
Payer: COMMERCIAL

## 2019-05-28 VITALS
TEMPERATURE: 98.1 F | SYSTOLIC BLOOD PRESSURE: 116 MMHG | DIASTOLIC BLOOD PRESSURE: 68 MMHG | RESPIRATION RATE: 20 BRPM | HEIGHT: 72 IN | WEIGHT: 207.23 LBS | HEART RATE: 81 BPM | OXYGEN SATURATION: 93 % | BODY MASS INDEX: 28.07 KG/M2

## 2019-05-28 DIAGNOSIS — J30.1 SEASONAL ALLERGIC RHINITIS DUE TO POLLEN: ICD-10-CM

## 2019-05-28 DIAGNOSIS — R41.3 FUNCTIONAL MEMORY PROBLEM: ICD-10-CM

## 2019-05-28 DIAGNOSIS — I25.84 CORONARY ARTERY CALCIFICATION OF NATIVE ARTERY: ICD-10-CM

## 2019-05-28 DIAGNOSIS — R05.9 COUGH: ICD-10-CM

## 2019-05-28 DIAGNOSIS — R06.02 SOB (SHORTNESS OF BREATH) ON EXERTION: ICD-10-CM

## 2019-05-28 DIAGNOSIS — C01 CANCER OF BASE OF TONGUE (HCC): ICD-10-CM

## 2019-05-28 DIAGNOSIS — I25.10 CORONARY ARTERY CALCIFICATION OF NATIVE ARTERY: ICD-10-CM

## 2019-05-28 PROCEDURE — 99214 OFFICE O/P EST MOD 30 MIN: CPT | Performed by: FAMILY MEDICINE

## 2019-05-28 NOTE — LETTER
[employer]  Didi Bar M.D.  [address]  [place] [address]  Fax: [phone]   Authorization for Release/Disclosure of   Protected Health Information   Name: JOSE JUAN MEDINA : 1955 SSN: [id number]   Address: [address] [place] [address]  [place] [address] Phone:    [phone] (home)    I authorize the entity listed below to release/disclose the PHI below to:   [facility]/Didi Bar M.D. and Didi Bar M.D.   Provider or Entity Name:  Dr. Enamorado    Address   [facility]   Phone:      Fax:     Reason for request: continuity of care   Information to be released:    [  ] LAST COLONOSCOPY,  including any PATH REPORT and follow-up  [  ] LAST FIT/COLOGUARD RESULT [  ] LAST DEXA  [  ] LAST MAMMOGRAM  [  ] LAST PAP  [x ] LAST LABS [  ] RETINA EXAM REPORT  [  ] IMMUNIZATION RECORDS  [  ] Release all info      [  ] Check here and initial the line next to each item to release ALL health information INCLUDING  _____ Care and treatment for drug and / or alcohol abuse  _____ HIV testing, infection status, or AIDS  _____ Genetic Testing    DATES OF SERVICE OR TIME PERIOD TO BE DISCLOSED: _____________  I understand and acknowledge that:  * This Authorization may be revoked at any time by you in writing, except if your health information has already been used or disclosed.  * Your health information that will be used or disclosed as a result of you signing this authorization could be re-disclosed by the recipient. If this occurs, your re-disclosed health information may no longer be protected by State or Federal laws.  * You may refuse to sign this Authorization. Your refusal will not affect your ability to obtain treatment.  * This Authorization becomes effective upon signing and will  on (date) __________.      If no date is indicated, this Authorization will  one (1) year from the signature date.    Name: Jose Juan Medina    Signature:   Date:     2019       PLEASE FAX REQUESTED  RECORDS BACK TO: [phone]

## 2019-05-28 NOTE — LETTER
[employer]  Didi Bar M.D.  [address]  [place] [address]  Fax: [phone]   Authorization for Release/Disclosure of   Protected Health Information   Name: JOSE JUAN MEDINA : 1955 SSN: [id number]   Address: [address] [place] [address]  [place] [address] Phone:    [phone] (home)    I authorize the entity listed below to release/disclose the PHI below to:   [facility]/Didi Bar M.D. and Didi Bar M.D.   Provider or Entity Name:  Cardiology- Dr. Pena    Address   [facility]   Phone:  [phone]    Fax:     Reason for request: continuity of care   Information to be released:    [  ] LAST COLONOSCOPY,  including any PATH REPORT and follow-up  [  ] LAST FIT/COLOGUARD RESULT [  ] LAST DEXA  [  ] LAST MAMMOGRAM  [  ] LAST PAP  [  ] LAST LABS [  ] RETINA EXAM REPORT  [  ] IMMUNIZATION RECORDS  [ x ] Release all info - stress test       [  ] Check here and initial the line next to each item to release ALL health information INCLUDING  _____ Care and treatment for drug and / or alcohol abuse  _____ HIV testing, infection status, or AIDS  _____ Genetic Testing    DATES OF SERVICE OR TIME PERIOD TO BE DISCLOSED: _____________  I understand and acknowledge that:  * This Authorization may be revoked at any time by you in writing, except if your health information has already been used or disclosed.  * Your health information that will be used or disclosed as a result of you signing this authorization could be re-disclosed by the recipient. If this occurs, your re-disclosed health information may no longer be protected by State or Federal laws.  * You may refuse to sign this Authorization. Your refusal will not affect your ability to obtain treatment.  * This Authorization becomes effective upon signing and will  on (date) __________.      If no date is indicated, this Authorization will  one (1) year from the signature date.    Name: Jose Juan Medina    Signature:   Date:        5/28/2019       PLEASE FAX REQUESTED RECORDS BACK TO: [phone]

## 2019-05-28 NOTE — ASSESSMENT & PLAN NOTE
Patient has a history of allergic rhinitis and has steroids on hands,  Took Prednisone 25 mg Sat, 20 mg Sunday and 15 mg daily.  This has helped.  He has enough prednisone to decrease the taper.

## 2019-05-29 ENCOUNTER — OFFICE VISIT (OUTPATIENT)
Dept: CARDIOLOGY | Facility: MEDICAL CENTER | Age: 64
End: 2019-05-29
Payer: COMMERCIAL

## 2019-05-29 VITALS
HEART RATE: 66 BPM | WEIGHT: 209 LBS | SYSTOLIC BLOOD PRESSURE: 106 MMHG | DIASTOLIC BLOOD PRESSURE: 72 MMHG | HEIGHT: 71 IN | OXYGEN SATURATION: 97 % | BODY MASS INDEX: 29.26 KG/M2

## 2019-05-29 DIAGNOSIS — E78.5 DYSLIPIDEMIA: ICD-10-CM

## 2019-05-29 DIAGNOSIS — R93.1 ELEVATED CORONARY ARTERY CALCIUM SCORE: ICD-10-CM

## 2019-05-29 DIAGNOSIS — I65.23 BILATERAL CAROTID ARTERY STENOSIS: ICD-10-CM

## 2019-05-29 PROCEDURE — 99204 OFFICE O/P NEW MOD 45 MIN: CPT | Performed by: INTERNAL MEDICINE

## 2019-05-29 RX ORDER — ROSUVASTATIN CALCIUM 10 MG/1
10 TABLET, COATED ORAL EVERY EVENING
Qty: 30 TAB | Refills: 11 | Status: SHIPPED | OUTPATIENT
Start: 2019-05-29 | End: 2019-08-07 | Stop reason: SDUPTHER

## 2019-05-29 ASSESSMENT — ENCOUNTER SYMPTOMS
NERVOUS/ANXIOUS: 0
BLURRED VISION: 0
VOMITING: 0
FEVER: 0
CHILLS: 0
NAUSEA: 0
ABDOMINAL PAIN: 0
EYE PAIN: 0
EYE DISCHARGE: 0
COUGH: 0
SPEECH CHANGE: 0
MYALGIAS: 0
HEMOPTYSIS: 0
WHEEZING: 0
PALPITATIONS: 0
NECK PAIN: 1
BRUISES/BLEEDS EASILY: 0
LOSS OF CONSCIOUSNESS: 0
BACK PAIN: 1
DEPRESSION: 0

## 2019-05-29 NOTE — LETTER
[facility]   [address] [place] [address]  [place] [address]  Phone: [phone]  Fax: [phone]              Cesar Bernard  1955    Encounter Date: 5/29/2019    Sampson Hendrix M.D.          PROGRESS NOTE:  Chief Complaint   Patient presents with   • Hyperlipidemia     NEW PATIENT       Subjective:   Cesar Bernard is a 63 y.o. male who presents today for new patient evaluation because of an elevated coronary calcium score and dyslipidemia.  He is also followed by cardiology in [facility].    Patient was having some atypical chest discomfort and underwent cardiac CT scanning for calcium scoring which was mildly elevated.  In addition, he underwent a stress echocardiogram which was apparently negative.    In the last 3 weeks he is noted increasing dyspnea.  He noted this at rest and with exertion.  It has been associated with a dry cough.  He was started on steroid therapy 3 to 4 days ago and his dyspnea seems to be improving.  He normally has no dyspnea on exertion.  He is noted no PND or orthopnea.  He denies any edema, palpitations or lightheadedness.    Past Medical History:   Diagnosis Date   • Allergy    • Asthma    • Hearing loss    • Hyperlipidemia    • Thyroid disease    • tongue cancer 2016    radiation, cetux      Past Surgical History:   Procedure Laterality Date   • LAMINOTOMY  04/13/1997    L4-L5   • DUPUYTREN CONTRACTURE RELEASE     • HERNIA REPAIR      ventral   • OTHER      Salivary Gland Transfer   • TONSILLECTOMY AND ADENOIDECTOMY       Family History   Problem Relation Age of Onset   • Psychiatry Mother    • Hyperlipidemia Mother    • Cancer Father         Prostate cancer    • Cancer Paternal Grandfather         Prostate cancer    • Cancer Maternal Aunt         Ovarian cancer   • Colon Cancer Paternal Uncle         At age 50s   • Cancer Paternal Uncle    • Alcohol/Drug Brother      Social History     Social History   • Marital status:  "     Spouse name: N/A   • Number of children: N/A   • Years of education: N/A     Occupational History   • Not on file.     Social History Main Topics   • Smoking status: Never Smoker   • Smokeless tobacco: Former User     Types: Chew   • Alcohol use 1.8 oz/week     2 Glasses of wine, 1 Shots of liquor per week   • Drug use: No   • Sexual activity: Yes     Partners: Female     Other Topics Concern   • Not on file     Social History Narrative   • No narrative on file     Allergies   Allergen Reactions   • Bee Venom Itching, Rash and Shortness of Breath   • Mangifera Indica Anaphylaxis     \"deathly allergic\" per pt  Other reaction(s): Other (Specify with Comments)  \"deathly allergic\" per pt   • Pistachio Nut Extract Skin Test Anaphylaxis   • Extract Of Poison Ivy Itching and Rash   • Other Misc Rash     Tumble Weed; itchy     Outpatient Encounter Prescriptions as of 5/29/2019   Medication Sig Dispense Refill   • aspirin EC (ECOTRIN) 81 MG Tablet Delayed Response Take 81 mg by mouth every day.     • Ubiquinol 300 MG Cap Take  by mouth every day.     • SYNTHROID 75 MCG Tab Take 75 mcg by mouth Every morning on an empty stomach.     • ezetimibe (ZETIA) 10 MG Tab Take 10 mg by mouth every day.     • rosuvastatin (CRESTOR) 5 MG Tab Take 5 mg by mouth every day.     • ZYCLARA PUMP 3.75 % Cream   4     No facility-administered encounter medications on file as of 5/29/2019.      Review of Systems   Constitutional: Negative for chills and fever.   HENT: Negative for congestion.    Eyes: Negative for blurred vision, pain and discharge.   Respiratory: Negative for cough, hemoptysis and wheezing.    Cardiovascular: Negative for chest pain and palpitations.   Gastrointestinal: Negative for abdominal pain, nausea and vomiting.   Musculoskeletal: Positive for back pain and neck pain. Negative for joint pain and myalgias.   Skin: Negative for itching and rash.   Neurological: Negative for speech change and loss of " "consciousness.   Endo/Heme/Allergies: Does not bruise/bleed easily.   Psychiatric/Behavioral: Negative for depression. The patient is not nervous/anxious.    All other systems reviewed and are negative.       Objective:   /72 (BP Location: Left arm, Patient Position: Sitting, BP Cuff Size: Adult)   Pulse 66   Ht 1.803 m (5' 11\")   Wt 94.8 kg (209 lb)   SpO2 97%   BMI 29.15 kg/m²      Physical Exam   Constitutional: He is oriented to person, place, and time. He appears well-developed and well-nourished. No distress.   HENT:   Head: Normocephalic and atraumatic.   Mouth/Throat: Mucous membranes are normal.   Neck: No JVD present. No thyromegaly present.   Cardiovascular: Normal rate, regular rhythm and intact distal pulses.  Exam reveals no gallop.    No murmur heard.  Pulmonary/Chest: Effort normal and breath sounds normal. He has no rales.   Abdominal: Soft. There is no splenomegaly or hepatomegaly.   Musculoskeletal: Normal range of motion. He exhibits no edema.   Lymphadenopathy:     He has no cervical adenopathy.   Neurological: He is alert and oriented to person, place, and time. He has normal strength. He exhibits normal muscle tone.   Skin: Skin is warm and dry. No rash noted.   Psychiatric: He has a normal mood and affect. His behavior is normal.         Assessment:     1. Elevated coronary artery calcium score     2. Bilateral carotid artery stenosis     3. Dyslipidemia         Medical Decision Making:  Today's Assessment / Status / Plan:     Mr. Bernard is clinically stable.  He does have evidence of what is probably mild coronary artery disease.  He did have a stress echocardiogram a couple of weeks ago and we will obtain that for review.  In addition, we will increase rosuvastatin to 10 mg daily.  He will have lab work in about 6 weeks and follow-up in about 2 months.      Didi Bar M.D.  [address] [address]  VIA In Basket                 "

## 2019-05-29 NOTE — PROGRESS NOTES
Chief Complaint   Patient presents with   • Hyperlipidemia     NEW PATIENT       Subjective:   Cesar Bernard is a 63 y.o. male who presents today for new patient evaluation because of an elevated coronary calcium score and dyslipidemia.  He is also followed by cardiology in [facility].    Patient was having some atypical chest discomfort and underwent cardiac CT scanning for calcium scoring which was mildly elevated.  In addition, he underwent a stress echocardiogram which was apparently negative.    In the last 3 weeks he is noted increasing dyspnea.  He noted this at rest and with exertion.  It has been associated with a dry cough.  He was started on steroid therapy 3 to 4 days ago and his dyspnea seems to be improving.  He normally has no dyspnea on exertion.  He is noted no PND or orthopnea.  He denies any edema, palpitations or lightheadedness.    Past Medical History:   Diagnosis Date   • Allergy    • Asthma    • Hearing loss    • Hyperlipidemia    • Thyroid disease    • tongue cancer 2016    radiation, cetux      Past Surgical History:   Procedure Laterality Date   • LAMINOTOMY  04/13/1997    L4-L5   • DUPUYTREN CONTRACTURE RELEASE     • HERNIA REPAIR      ventral   • OTHER      Salivary Gland Transfer   • TONSILLECTOMY AND ADENOIDECTOMY       Family History   Problem Relation Age of Onset   • Psychiatry Mother    • Hyperlipidemia Mother    • Cancer Father         Prostate cancer    • Cancer Paternal Grandfather         Prostate cancer    • Cancer Maternal Aunt         Ovarian cancer   • Colon Cancer Paternal Uncle         At age 50s   • Cancer Paternal Uncle    • Alcohol/Drug Brother      Social History     Social History   • Marital status:      Spouse name: N/A   • Number of children: N/A   • Years of education: N/A     Occupational History   • Not on file.     Social History Main Topics   • Smoking status: Never Smoker   • Smokeless tobacco: Former User     Types: Chew   • Alcohol use 1.8  "oz/week     2 Glasses of wine, 1 Shots of liquor per week   • Drug use: No   • Sexual activity: Yes     Partners: Female     Other Topics Concern   • Not on file     Social History Narrative   • No narrative on file     Allergies   Allergen Reactions   • Bee Venom Itching, Rash and Shortness of Breath   • Mangifera Indica Anaphylaxis     \"deathly allergic\" per pt  Other reaction(s): Other (Specify with Comments)  \"deathly allergic\" per pt   • Pistachio Nut Extract Skin Test Anaphylaxis   • Extract Of Poison Ivy Itching and Rash   • Other Misc Rash     Tumble Weed; itchy     Outpatient Encounter Prescriptions as of 5/29/2019   Medication Sig Dispense Refill   • aspirin EC (ECOTRIN) 81 MG Tablet Delayed Response Take 81 mg by mouth every day.     • Ubiquinol 300 MG Cap Take  by mouth every day.     • SYNTHROID 75 MCG Tab Take 75 mcg by mouth Every morning on an empty stomach.     • ezetimibe (ZETIA) 10 MG Tab Take 10 mg by mouth every day.     • rosuvastatin (CRESTOR) 5 MG Tab Take 5 mg by mouth every day.     • ZYCLARA PUMP 3.75 % Cream   4     No facility-administered encounter medications on file as of 5/29/2019.      Review of Systems   Constitutional: Negative for chills and fever.   HENT: Negative for congestion.    Eyes: Negative for blurred vision, pain and discharge.   Respiratory: Negative for cough, hemoptysis and wheezing.    Cardiovascular: Negative for chest pain and palpitations.   Gastrointestinal: Negative for abdominal pain, nausea and vomiting.   Musculoskeletal: Positive for back pain and neck pain. Negative for joint pain and myalgias.   Skin: Negative for itching and rash.   Neurological: Negative for speech change and loss of consciousness.   Endo/Heme/Allergies: Does not bruise/bleed easily.   Psychiatric/Behavioral: Negative for depression. The patient is not nervous/anxious.    All other systems reviewed and are negative.       Objective:   /72 (BP Location: Left arm, Patient Position: " "Sitting, BP Cuff Size: Adult)   Pulse 66   Ht 1.803 m (5' 11\")   Wt 94.8 kg (209 lb)   SpO2 97%   BMI 29.15 kg/m²     Physical Exam   Constitutional: He is oriented to person, place, and time. He appears well-developed and well-nourished. No distress.   HENT:   Head: Normocephalic and atraumatic.   Mouth/Throat: Mucous membranes are normal.   Neck: No JVD present. No thyromegaly present.   Cardiovascular: Normal rate, regular rhythm and intact distal pulses.  Exam reveals no gallop.    No murmur heard.  Pulmonary/Chest: Effort normal and breath sounds normal. He has no rales.   Abdominal: Soft. There is no splenomegaly or hepatomegaly.   Musculoskeletal: Normal range of motion. He exhibits no edema.   Lymphadenopathy:     He has no cervical adenopathy.   Neurological: He is alert and oriented to person, place, and time. He has normal strength. He exhibits normal muscle tone.   Skin: Skin is warm and dry. No rash noted.   Psychiatric: He has a normal mood and affect. His behavior is normal.         Assessment:     1. Elevated coronary artery calcium score     2. Bilateral carotid artery stenosis     3. Dyslipidemia         Medical Decision Making:  Today's Assessment / Status / Plan:     Mr. Bernard is clinically stable.  He does have evidence of what is probably mild coronary artery disease.  He did have a stress echocardiogram a couple of weeks ago and we will obtain that for review.  In addition, we will increase rosuvastatin to 10 mg daily.  He will have lab work in about 6 weeks and follow-up in about 2 months.  "

## 2019-05-30 NOTE — ASSESSMENT & PLAN NOTE
CT scan of the coronary arteries showed a coronary calcium score in the LAD of 107 the RCA of 14 for a total of 121.  Patient has not established with a cardiologist in [place] yet but decided to follow-up with a cardiologist in [place].  He had a negative stress test 2 weeks ago.

## 2019-07-18 DIAGNOSIS — R93.1 ELEVATED CORONARY ARTERY CALCIUM SCORE: ICD-10-CM

## 2019-07-18 DIAGNOSIS — E78.5 DYSLIPIDEMIA: ICD-10-CM

## 2019-08-07 ENCOUNTER — TELEPHONE (OUTPATIENT)
Dept: CARDIOLOGY | Facility: MEDICAL CENTER | Age: 64
End: 2019-08-07

## 2019-08-07 DIAGNOSIS — E78.5 DYSLIPIDEMIA: ICD-10-CM

## 2019-08-07 RX ORDER — ROSUVASTATIN CALCIUM 10 MG/1
5 TABLET, COATED ORAL EVERY EVENING
Qty: 30 TAB | Refills: 11 | COMMUNITY
Start: 2019-08-07

## 2019-08-07 NOTE — TELEPHONE ENCOUNTER
Result Notes for Lipid Profile   Notes recorded by Sampson Hendrix M.D. on 8/7/2019 at 7:42 AM PDT  Please give the patient a call and let him know that his LDL is significantly elevated.  In addition, please asked him if he is still taking rosuvastatin as ordered.  Also he needs a follow-up appointment.     Called pt and notified of LDL results. Confirmed he is taking Zetia as prescribed, but he reports he only took the increased dose of 10mg rosuvastatin for a few days before he switched back to 5mg. MAR updated. He reports he had muscle aches and memory issues on higher dose. He is not currently open to taking 10mg, and says ideally he would like to improve his cholesterol with diet and exercise so he doesn't have to be on any medications. Transferred him to scheduling to set up next available f/u appt.

## 2021-03-15 DIAGNOSIS — Z23 NEED FOR VACCINATION: ICD-10-CM

## 2021-12-26 NOTE — PROGRESS NOTES
INSTRUCCIONES / RESUMEN DE ESTADO PREVIO AL PARTO AL RECIBIR EL GRACIA  (ANTEPARTUM DISCHARGE SUMMARY and INSTRUCTIONS)    Usted está siendo flynn de gracia sin abilio a celia porque usted y matta bebé se encuentran en condición estable (You are being discharged undelivered because you and your baby are in stable condition).     STATUS NOTE:  Date:  12/26/2021    Destination: Home    ACTIVIDAD (ACTIVITY):  · Periodos frecuentes de descanso (frequent rest periods)  · No tenga actividad sexual (no sexual activity)  · No heavy lifting    DIETA (DIET):  · Continúe con marshal dieta saludable de embarazo (continúe to eat a healthy pregnancy diet).  · Debbie 8-10 vasos de agua diariamente (Be sure to drink 8-10 glasses of water a day)  · No deje que pasen más de 8-10 horas sin comer ni beber. (Don't go more than 8-10 hours without eating or drinking).    SENALES DE PRECAUCION DEL EMBARAZO:  CONTACTE CON MATTA MÉDICO O PARTERA SI OBSERVA CUALQUIERA DE LAS SIGUIENTES SEÑALES DE PRECAUCIÓN EN EL EMBARAZO  (Contact your Doctor or Midwife if you have any of these warning signs of pregnancy):  · Dolor repentino o kane (sudden or constant pain)  · Sangrado vaginal (vaginal bleeding)  · Flujo abundante o goteo vaginal repentino (sudden gush or leaking fluid from the vagina)  · Desmayo (fainting)  · Dolor de alberta que no desaparece con ankur medidas normales de aplacamiento (Headache that will not go away with your normal comfort measures)  · Algún cambio en matta visión nanette visión borrosa, visión doble, estrellitas en los ojos (any changes in your vision, such as blurry vision, double vision or spots/stars before your eyes)  · Nausea ó vómito severo (severe nausea and vomiting)  · Orina escasa o inexistente, dolor y ardor al orinar o trevor en la orina (little or no urine, pain and burning with urination, or blood in your urine).  · Escalofríos o fiebre (chills or fever).  · Aumento o cambio en el flujo vaginal (increase or change in vaginal  "Subjective:     Chief Complaint   Patient presents with   • Cough     x 4 weeks; wheezing, chest pressure, SOB        Cesar Bernard is a 63 y.o. male here today for evaluation and management of:    Cough  Patient has a history of allergic rhinitis and has steroids on hands,  Took Prednisone 25 mg Sat, 20 mg Sunday and 15 mg daily.  This has helped.  He has enough prednisone to decrease the taper.    SOB (shortness of breath) on exertion  Patient is having SOB.  He had a stress test in [place] by a cardiologist there 2+ weeks ago.      Elevated coronary artery calcium score  CT scan of the coronary arteries showed a coronary calcium score in the LAD of 107 the RCA of 14 for a total of 121.  Patient has not established with a cardiologist in [place] yet but decided to follow-up with a cardiologist in [place].  He had a negative stress test 2 weeks ago.       Allergies   Allergen Reactions   • Bee Venom Itching, Rash and Shortness of Breath   • Mangifera Indica Anaphylaxis     \"deathly allergic\" per pt  Other reaction(s): Other (Specify with Comments)  \"deathly allergic\" per pt   • Pistachio Nut Extract Skin Test Anaphylaxis   • Extract Of Poison Ivy Itching and Rash   • Other Misc Rash     Tumble Weed; itchy       Current medicines (including changes today)  Current Outpatient Prescriptions   Medication Sig Dispense Refill   • Ubiquinol 300 MG Cap Take  by mouth every day.     • SYNTHROID 75 MCG Tab Take 75 mcg by mouth Every morning on an empty stomach.     • ezetimibe (ZETIA) 10 MG Tab Take 10 mg by mouth every day.     • ZYCLARA PUMP 3.75 % Cream   4   • aspirin EC (ECOTRIN) 81 MG Tablet Delayed Response Take 81 mg by mouth every day.     • rosuvastatin (CRESTOR) 10 MG Tab Take 1 Tab by mouth every evening. 30 Tab 11     No current facility-administered medications for this visit.        He  has a past medical history of Allergy; Asthma; Hearing loss; Hyperlipidemia; Thyroid disease; and tongue cancer " "(2016).    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  No nausea or vomiting.  No chest pain or palpitations.  No cough or SOB.  No pain with urination or hematuria.  No black or bloody stools.       Objective:     /68 (BP Location: Left arm, Patient Position: Sitting, BP Cuff Size: Adult)   Pulse 81   Temp 36.7 °C (98.1 °F) (Temporal)   Resp 20   Ht 1.816 m (5' 11.5\")   Wt 94 kg (207 lb 3.7 oz)   SpO2 93%  Body mass index is 28.5 kg/m².   Physical Exam:  Well developed, well nourished.  Alert, oriented in no acute distress.  Eye contact is good, speech goal directed, affect calm  Eyes: conjunctiva non-injected, sclera non-icteric.  Ears: Pinna normal. TM pearly gray.   Nose: Nares are patent.  Pale, boggy mucosa  Mouth: Oral mucous membranes pink and moist with no lesions.  Neck Supple.  No adenopathy or masses in the neck or supraclavicular regions. No thyromegaly  Lungs: clear to auscultation bilaterally with good excursion. No wheezes or rhonchi  CV: regular rate and rhythm. No murmur  MMSE 29/30.  Missed one word on recall testing          Assessment and Plan:   The following treatment plan was discussed    1. Cough  Most likely allergic.  Continue " discharge)  · Harper bebé se mueve menos de lo habitual (your baby moves less than usual.)     A copy of this form was provided to and reviewed with Radha Marks by RN, 12/26/2021. Patient verbalized understanding and has no further questions at this time.     prednisone taper.  Call if symptoms worsen    2. Seasonal allergic rhinitis due to pollen  Discussed avoidance measures.  Allegra or Claritin daily.  Consider adding in Singulair    3. Cancer of base of tongue (HCC)  Continue follow-up with [facility].    4. SOB (shortness of breath) on exertion  Normal stress test 2 weeks ago.  We will get the records from his previous doctor to see what work-up has been done    5. Coronary artery calcification of native artery  Discussed that it would still be worthwhile establish with a cardiologist here in [place].    6. Functional memory problem  Continue to monitor    Any change or worsening of signs or symptoms, patient encouraged to follow-up or report to the emergency room for further evaluation. Patient understands and agrees.    Followup: Return in about 4 weeks (around 6/25/2019).

## 2023-10-18 ENCOUNTER — HOSPITAL ENCOUNTER (OUTPATIENT)
Dept: RADIOLOGY | Facility: MEDICAL CENTER | Age: 68
End: 2023-10-18
Attending: FAMILY MEDICINE
Payer: COMMERCIAL

## 2023-10-18 DIAGNOSIS — R06.00 DYSPNEA, PAROXYSMAL NOCTURNAL: ICD-10-CM

## 2023-10-18 PROCEDURE — 71046 X-RAY EXAM CHEST 2 VIEWS: CPT

## 2024-08-08 ENCOUNTER — OFFICE VISIT (OUTPATIENT)
Facility: LOCATION | Age: 69
End: 2024-08-08
Payer: COMMERCIAL

## 2024-08-08 DIAGNOSIS — H16.223 KERATOCONJUNCT SICCA, NOT SPECIFIED AS SJOGREN'S, BILATERAL: ICD-10-CM

## 2024-08-08 DIAGNOSIS — H25.13 AGE-RELATED NUCLEAR CATARACT, BILATERAL: Primary | ICD-10-CM

## 2024-08-08 DIAGNOSIS — H43.813 VITREOUS DEGENERATION, BILATERAL: ICD-10-CM

## 2024-08-08 DIAGNOSIS — H01.022 SQUAMOUS BLEPHARITIS RIGHT LOWER EYELID: ICD-10-CM

## 2024-08-08 DIAGNOSIS — H01.025 SQUAMOUS BLEPHARITIS LEFT LOWER EYELID: ICD-10-CM

## 2024-08-08 PROCEDURE — 92004 COMPRE OPH EXAM NEW PT 1/>: CPT | Performed by: OPHTHALMOLOGY

## 2024-08-08 ASSESSMENT — INTRAOCULAR PRESSURE
OS: 9
OD: 8

## 2024-08-20 ENCOUNTER — OFFICE VISIT (OUTPATIENT)
Facility: LOCATION | Age: 69
End: 2024-08-20

## 2024-08-20 DIAGNOSIS — H52.223 REGULAR ASTIGMATISM, BILATERAL: Primary | ICD-10-CM

## 2024-08-20 ASSESSMENT — VISUAL ACUITY
OD: 20/20
OS: 20/20

## 2024-08-30 ENCOUNTER — HOSPITAL ENCOUNTER (OUTPATIENT)
Dept: RADIOLOGY | Facility: MEDICAL CENTER | Age: 69
End: 2024-08-30
Attending: FAMILY MEDICINE
Payer: MEDICARE

## 2024-08-30 DIAGNOSIS — K59.00 COLONIC CONSTIPATION: ICD-10-CM

## 2024-08-30 DIAGNOSIS — G31.84 MILD COGNITIVE IMPAIRMENT: ICD-10-CM

## 2024-08-30 DIAGNOSIS — R07.9 CHEST PAIN RADIATING TO JAW: ICD-10-CM

## 2024-08-30 PROCEDURE — 71046 X-RAY EXAM CHEST 2 VIEWS: CPT

## 2024-09-30 ENCOUNTER — APPOINTMENT (OUTPATIENT)
Dept: RADIOLOGY | Facility: MEDICAL CENTER | Age: 69
End: 2024-09-30
Attending: FAMILY MEDICINE
Payer: MEDICARE

## 2024-12-23 ENCOUNTER — HOSPITAL ENCOUNTER (OUTPATIENT)
Facility: MEDICAL CENTER | Age: 69
End: 2024-12-24
Attending: EMERGENCY MEDICINE | Admitting: HOSPITALIST
Payer: MEDICARE

## 2024-12-23 ENCOUNTER — APPOINTMENT (OUTPATIENT)
Dept: RADIOLOGY | Facility: MEDICAL CENTER | Age: 69
End: 2024-12-23
Attending: EMERGENCY MEDICINE
Payer: MEDICARE

## 2024-12-23 ENCOUNTER — TELEPHONE (OUTPATIENT)
Dept: CARDIOLOGY | Facility: MEDICAL CENTER | Age: 69
End: 2024-12-23
Payer: MEDICARE

## 2024-12-23 DIAGNOSIS — R07.2 PRECORDIAL CHEST PAIN: ICD-10-CM

## 2024-12-23 DIAGNOSIS — R94.31 ABNORMAL EKG: ICD-10-CM

## 2024-12-23 PROBLEM — E78.2 MIXED HYPERLIPIDEMIA: Status: ACTIVE | Noted: 2024-12-23

## 2024-12-23 PROBLEM — F41.9 ANXIETY: Status: ACTIVE | Noted: 2024-12-23

## 2024-12-23 PROBLEM — R03.0 ELEVATED BLOOD PRESSURE READING: Status: ACTIVE | Noted: 2024-12-23

## 2024-12-23 PROBLEM — R07.9 CHEST PAIN: Status: ACTIVE | Noted: 2024-12-23

## 2024-12-23 PROBLEM — E03.9 ACQUIRED HYPOTHYROIDISM: Status: ACTIVE | Noted: 2024-12-23

## 2024-12-23 LAB
ALBUMIN SERPL BCP-MCNC: 4.5 G/DL (ref 3.2–4.9)
ALBUMIN/GLOB SERPL: 2 G/DL
ALP SERPL-CCNC: 44 U/L (ref 30–99)
ALT SERPL-CCNC: 17 U/L (ref 2–50)
ANION GAP SERPL CALC-SCNC: 12 MMOL/L (ref 7–16)
AST SERPL-CCNC: 17 U/L (ref 12–45)
BASOPHILS # BLD AUTO: 1 % (ref 0–1.8)
BASOPHILS # BLD: 0.05 K/UL (ref 0–0.12)
BILIRUB SERPL-MCNC: 0.4 MG/DL (ref 0.1–1.5)
BUN SERPL-MCNC: 13 MG/DL (ref 8–22)
CALCIUM ALBUM COR SERPL-MCNC: 9 MG/DL (ref 8.5–10.5)
CALCIUM SERPL-MCNC: 9.4 MG/DL (ref 8.4–10.2)
CHLORIDE SERPL-SCNC: 105 MMOL/L (ref 96–112)
CO2 SERPL-SCNC: 24 MMOL/L (ref 20–33)
CREAT SERPL-MCNC: 0.94 MG/DL (ref 0.5–1.4)
EKG IMPRESSION: NORMAL
EOSINOPHIL # BLD AUTO: 0.13 K/UL (ref 0–0.51)
EOSINOPHIL NFR BLD: 2.6 % (ref 0–6.9)
ERYTHROCYTE [DISTWIDTH] IN BLOOD BY AUTOMATED COUNT: 44.8 FL (ref 35.9–50)
GFR SERPLBLD CREATININE-BSD FMLA CKD-EPI: 89 ML/MIN/1.73 M 2
GLOBULIN SER CALC-MCNC: 2.3 G/DL (ref 1.9–3.5)
GLUCOSE SERPL-MCNC: 92 MG/DL (ref 65–99)
HCT VFR BLD AUTO: 48.6 % (ref 42–52)
HGB BLD-MCNC: 16.6 G/DL (ref 14–18)
IMM GRANULOCYTES # BLD AUTO: 0.01 K/UL (ref 0–0.11)
IMM GRANULOCYTES NFR BLD AUTO: 0.2 % (ref 0–0.9)
LYMPHOCYTES # BLD AUTO: 0.91 K/UL (ref 1–4.8)
LYMPHOCYTES NFR BLD: 18.5 % (ref 22–41)
MCH RBC QN AUTO: 31.9 PG (ref 27–33)
MCHC RBC AUTO-ENTMCNC: 34.2 G/DL (ref 32.3–36.5)
MCV RBC AUTO: 93.5 FL (ref 81.4–97.8)
MONOCYTES # BLD AUTO: 0.33 K/UL (ref 0–0.85)
MONOCYTES NFR BLD AUTO: 6.7 % (ref 0–13.4)
NEUTROPHILS # BLD AUTO: 3.5 K/UL (ref 1.82–7.42)
NEUTROPHILS NFR BLD: 71 % (ref 44–72)
NRBC # BLD AUTO: 0 K/UL
NRBC BLD-RTO: 0 /100 WBC (ref 0–0.2)
PLATELET # BLD AUTO: 207 K/UL (ref 164–446)
PMV BLD AUTO: 9.9 FL (ref 9–12.9)
POTASSIUM SERPL-SCNC: 4 MMOL/L (ref 3.6–5.5)
PROT SERPL-MCNC: 6.8 G/DL (ref 6–8.2)
RBC # BLD AUTO: 5.2 M/UL (ref 4.7–6.1)
SODIUM SERPL-SCNC: 141 MMOL/L (ref 135–145)
TROPONIN T SERPL-MCNC: 15 NG/L (ref 6–19)
TROPONIN T SERPL-MCNC: 18 NG/L (ref 6–19)
WBC # BLD AUTO: 4.9 K/UL (ref 4.8–10.8)

## 2024-12-23 PROCEDURE — 93005 ELECTROCARDIOGRAM TRACING: CPT | Mod: TC

## 2024-12-23 PROCEDURE — G0378 HOSPITAL OBSERVATION PER HR: HCPCS

## 2024-12-23 PROCEDURE — 94760 N-INVAS EAR/PLS OXIMETRY 1: CPT

## 2024-12-23 PROCEDURE — 36415 COLL VENOUS BLD VENIPUNCTURE: CPT

## 2024-12-23 PROCEDURE — 80053 COMPREHEN METABOLIC PANEL: CPT

## 2024-12-23 PROCEDURE — 93005 ELECTROCARDIOGRAM TRACING: CPT | Mod: TC | Performed by: EMERGENCY MEDICINE

## 2024-12-23 PROCEDURE — 99285 EMERGENCY DEPT VISIT HI MDM: CPT

## 2024-12-23 PROCEDURE — 84484 ASSAY OF TROPONIN QUANT: CPT

## 2024-12-23 PROCEDURE — 99223 1ST HOSP IP/OBS HIGH 75: CPT | Performed by: HOSPITALIST

## 2024-12-23 PROCEDURE — 85025 COMPLETE CBC W/AUTO DIFF WBC: CPT

## 2024-12-23 PROCEDURE — 71045 X-RAY EXAM CHEST 1 VIEW: CPT

## 2024-12-23 RX ORDER — ALPRAZOLAM 0.5 MG
0.5 TABLET ORAL
Status: DISCONTINUED | OUTPATIENT
Start: 2024-12-23 | End: 2024-12-24 | Stop reason: HOSPADM

## 2024-12-23 RX ORDER — ONDANSETRON 4 MG/1
4 TABLET, ORALLY DISINTEGRATING ORAL EVERY 4 HOURS PRN
Status: DISCONTINUED | OUTPATIENT
Start: 2024-12-23 | End: 2024-12-24 | Stop reason: HOSPADM

## 2024-12-23 RX ORDER — ONDANSETRON 2 MG/ML
4 INJECTION INTRAMUSCULAR; INTRAVENOUS EVERY 4 HOURS PRN
Status: DISCONTINUED | OUTPATIENT
Start: 2024-12-23 | End: 2024-12-24 | Stop reason: HOSPADM

## 2024-12-23 RX ORDER — LEVOTHYROXINE SODIUM 100 UG/1
100 TABLET ORAL
COMMUNITY

## 2024-12-23 RX ORDER — ASPIRIN 81 MG/1
81 TABLET ORAL DAILY
Status: DISCONTINUED | OUTPATIENT
Start: 2024-12-24 | End: 2024-12-23

## 2024-12-23 RX ORDER — AMOXICILLIN 250 MG
2 CAPSULE ORAL EVERY EVENING
Status: DISCONTINUED | OUTPATIENT
Start: 2024-12-23 | End: 2024-12-24 | Stop reason: HOSPADM

## 2024-12-23 RX ORDER — ALPRAZOLAM 1 MG/1
0.5 TABLET ORAL PRN
COMMUNITY

## 2024-12-23 RX ORDER — ROSUVASTATIN CALCIUM 10 MG/1
5 TABLET, COATED ORAL EVERY EVENING
Status: DISCONTINUED | OUTPATIENT
Start: 2024-12-23 | End: 2024-12-23

## 2024-12-23 RX ORDER — ASPIRIN 325 MG
325 TABLET ORAL ONCE
Status: DISCONTINUED | OUTPATIENT
Start: 2024-12-23 | End: 2024-12-23

## 2024-12-23 RX ORDER — ASPIRIN 81 MG/1
81 TABLET ORAL DAILY
Status: DISCONTINUED | OUTPATIENT
Start: 2024-12-24 | End: 2024-12-24 | Stop reason: HOSPADM

## 2024-12-23 RX ORDER — EZETIMIBE 10 MG/1
10 TABLET ORAL DAILY
Status: DISCONTINUED | OUTPATIENT
Start: 2024-12-24 | End: 2024-12-23

## 2024-12-23 RX ORDER — ASPIRIN 81 MG/1
324 TABLET, CHEWABLE ORAL ONCE
Status: DISCONTINUED | OUTPATIENT
Start: 2024-12-23 | End: 2024-12-23

## 2024-12-23 RX ORDER — ASPIRIN 300 MG/1
300 SUPPOSITORY RECTAL ONCE
Status: DISCONTINUED | OUTPATIENT
Start: 2024-12-23 | End: 2024-12-23

## 2024-12-23 RX ORDER — ENOXAPARIN SODIUM 100 MG/ML
40 INJECTION SUBCUTANEOUS DAILY
Status: DISCONTINUED | OUTPATIENT
Start: 2024-12-24 | End: 2024-12-24 | Stop reason: HOSPADM

## 2024-12-23 RX ORDER — LEVOTHYROXINE SODIUM 100 UG/1
100 TABLET ORAL
Status: DISCONTINUED | OUTPATIENT
Start: 2024-12-24 | End: 2024-12-24 | Stop reason: HOSPADM

## 2024-12-23 RX ORDER — LEVOTHYROXINE SODIUM 100 UG/1
100 TABLET ORAL
Status: DISCONTINUED | OUTPATIENT
Start: 2024-12-24 | End: 2024-12-23

## 2024-12-23 RX ORDER — POLYETHYLENE GLYCOL 3350 17 G/17G
1 POWDER, FOR SOLUTION ORAL
Status: DISCONTINUED | OUTPATIENT
Start: 2024-12-23 | End: 2024-12-24 | Stop reason: HOSPADM

## 2024-12-23 RX ORDER — LABETALOL HYDROCHLORIDE 5 MG/ML
10 INJECTION, SOLUTION INTRAVENOUS EVERY 4 HOURS PRN
Status: DISCONTINUED | OUTPATIENT
Start: 2024-12-23 | End: 2024-12-24 | Stop reason: HOSPADM

## 2024-12-23 RX ORDER — ACETAMINOPHEN 325 MG/1
650 TABLET ORAL EVERY 6 HOURS PRN
Status: DISCONTINUED | OUTPATIENT
Start: 2024-12-23 | End: 2024-12-24 | Stop reason: HOSPADM

## 2024-12-23 ASSESSMENT — ENCOUNTER SYMPTOMS
EYE DISCHARGE: 0
FOCAL WEAKNESS: 0
FLANK PAIN: 0
VOMITING: 0
STRIDOR: 0
SHORTNESS OF BREATH: 0
COUGH: 0
NERVOUS/ANXIOUS: 0
MYALGIAS: 0
FEVER: 0
CHILLS: 0
BRUISES/BLEEDS EASILY: 0
ABDOMINAL PAIN: 0
EYE REDNESS: 0

## 2024-12-23 ASSESSMENT — PATIENT HEALTH QUESTIONNAIRE - PHQ9
1. LITTLE INTEREST OR PLEASURE IN DOING THINGS: NOT AT ALL
SUM OF ALL RESPONSES TO PHQ9 QUESTIONS 1 AND 2: 0
2. FEELING DOWN, DEPRESSED, IRRITABLE, OR HOPELESS: NOT AT ALL

## 2024-12-23 ASSESSMENT — HEART SCORE
RISK FACTORS: 1-2 RISK FACTORS
AGE: 65+
HEART SCORE: 5
HISTORY: SLIGHTLY SUSPICIOUS
AGE: 65+
RISK FACTORS: 1-2 RISK FACTORS
ECG: SIGNIFICANT ST-DEPRESSION
TROPONIN: LESS THAN OR EQUAL TO NORMAL LIMIT
ECG: NORMAL
HEART SCORE: 3
TROPONIN: LESS THAN OR EQUAL TO NORMAL LIMIT
HISTORY: SLIGHTLY SUSPICIOUS

## 2024-12-23 ASSESSMENT — SOCIAL DETERMINANTS OF HEALTH (SDOH)
WITHIN THE LAST YEAR, HAVE YOU BEEN AFRAID OF YOUR PARTNER OR EX-PARTNER?: NO
WITHIN THE LAST YEAR, HAVE YOU BEEN KICKED, HIT, SLAPPED, OR OTHERWISE PHYSICALLY HURT BY YOUR PARTNER OR EX-PARTNER?: NO
WITHIN THE LAST YEAR, HAVE YOU BEEN HUMILIATED OR EMOTIONALLY ABUSED IN OTHER WAYS BY YOUR PARTNER OR EX-PARTNER?: NO
WITHIN THE LAST YEAR, HAVE TO BEEN RAPED OR FORCED TO HAVE ANY KIND OF SEXUAL ACTIVITY BY YOUR PARTNER OR EX-PARTNER?: NO

## 2024-12-23 NOTE — ED TRIAGE NOTES
Chief Complaint   Patient presents with    Chest Pain     Left-sided, sharp and continuous.   States he took NTG SL just prior to arrival, endorses improved CP. This was prescribed to him by his  doctor.      BP (!) 174/95   Pulse 77   Temp 36.7 °C (98 °F) (Temporal)   Resp 16   Ht 1.829 m (6')   Wt 93.2 kg (205 lb 7.5 oz)   SpO2 97%   BMI 27.87 kg/m²

## 2024-12-23 NOTE — TELEPHONE ENCOUNTER
Tried calling PT multiple times to schedule an appointment - Line drops every time and will not ring - Sent MyChart letter - CR

## 2024-12-23 NOTE — TELEPHONE ENCOUNTER
----- Message from Tonya TONG sent at 2024  4:58 PM PST -----  PATIENT NAME:  JOSE JUAN MEDINA  CALLER NAME:  DR. CANALES  REASON FOR CALL:  REFERRAL  PHONE NUMBER:  [phone]  CARDIO PROVIDER:  DR. MICHELLE  : 1955  MRN:   ADVISED 1-2 BUSINESS DAYS FOR CALL BACK Y/N:

## 2024-12-24 ENCOUNTER — APPOINTMENT (OUTPATIENT)
Dept: CARDIOLOGY | Facility: MEDICAL CENTER | Age: 69
End: 2024-12-24
Attending: HOSPITALIST
Payer: MEDICARE

## 2024-12-24 ENCOUNTER — APPOINTMENT (OUTPATIENT)
Dept: RADIOLOGY | Facility: MEDICAL CENTER | Age: 69
End: 2024-12-24
Attending: HOSPITALIST
Payer: MEDICARE

## 2024-12-24 ENCOUNTER — PHARMACY VISIT (OUTPATIENT)
Dept: PHARMACY | Facility: MEDICAL CENTER | Age: 69
End: 2024-12-24
Payer: COMMERCIAL

## 2024-12-24 VITALS
HEART RATE: 56 BPM | DIASTOLIC BLOOD PRESSURE: 86 MMHG | WEIGHT: 203.71 LBS | BODY MASS INDEX: 27.59 KG/M2 | HEIGHT: 72 IN | OXYGEN SATURATION: 98 % | SYSTOLIC BLOOD PRESSURE: 141 MMHG | TEMPERATURE: 97 F | RESPIRATION RATE: 16 BRPM

## 2024-12-24 LAB
ALBUMIN SERPL BCP-MCNC: 4 G/DL (ref 3.2–4.9)
ALBUMIN/GLOB SERPL: 2.1 G/DL
ALP SERPL-CCNC: 37 U/L (ref 30–99)
ALT SERPL-CCNC: 14 U/L (ref 2–50)
ANION GAP SERPL CALC-SCNC: 11 MMOL/L (ref 7–16)
AST SERPL-CCNC: 13 U/L (ref 12–45)
BILIRUB SERPL-MCNC: 0.6 MG/DL (ref 0.1–1.5)
BUN SERPL-MCNC: 14 MG/DL (ref 8–22)
CALCIUM ALBUM COR SERPL-MCNC: 9 MG/DL (ref 8.5–10.5)
CALCIUM SERPL-MCNC: 9 MG/DL (ref 8.4–10.2)
CHLORIDE SERPL-SCNC: 109 MMOL/L (ref 96–112)
CHOLEST SERPL-MCNC: 184 MG/DL (ref 100–199)
CO2 SERPL-SCNC: 22 MMOL/L (ref 20–33)
CREAT SERPL-MCNC: 0.85 MG/DL (ref 0.5–1.4)
ERYTHROCYTE [DISTWIDTH] IN BLOOD BY AUTOMATED COUNT: 45.1 FL (ref 35.9–50)
GFR SERPLBLD CREATININE-BSD FMLA CKD-EPI: 95 ML/MIN/1.73 M 2
GLOBULIN SER CALC-MCNC: 1.9 G/DL (ref 1.9–3.5)
GLUCOSE SERPL-MCNC: 99 MG/DL (ref 65–99)
HCT VFR BLD AUTO: 45.4 % (ref 42–52)
HDLC SERPL-MCNC: 39 MG/DL
HGB BLD-MCNC: 15.4 G/DL (ref 14–18)
LDLC SERPL CALC-MCNC: 122 MG/DL
LV EJECT FRACT MOD 2C 99903: 66.63
LV EJECT FRACT MOD 4C 99902: 66.49
LV EJECT FRACT MOD BP 99901: 67.02
MAGNESIUM SERPL-MCNC: 2.1 MG/DL (ref 1.5–2.5)
MCH RBC QN AUTO: 32 PG (ref 27–33)
MCHC RBC AUTO-ENTMCNC: 33.9 G/DL (ref 32.3–36.5)
MCV RBC AUTO: 94.2 FL (ref 81.4–97.8)
PLATELET # BLD AUTO: 180 K/UL (ref 164–446)
PMV BLD AUTO: 9.6 FL (ref 9–12.9)
POTASSIUM SERPL-SCNC: 3.6 MMOL/L (ref 3.6–5.5)
PROT SERPL-MCNC: 5.9 G/DL (ref 6–8.2)
RBC # BLD AUTO: 4.82 M/UL (ref 4.7–6.1)
SODIUM SERPL-SCNC: 142 MMOL/L (ref 135–145)
TRIGL SERPL-MCNC: 117 MG/DL (ref 0–149)
WBC # BLD AUTO: 3.5 K/UL (ref 4.8–10.8)

## 2024-12-24 PROCEDURE — RXMED WILLOW AMBULATORY MEDICATION CHARGE: Performed by: INTERNAL MEDICINE

## 2024-12-24 PROCEDURE — 700102 HCHG RX REV CODE 250 W/ 637 OVERRIDE(OP): Performed by: HOSPITALIST

## 2024-12-24 PROCEDURE — 80053 COMPREHEN METABOLIC PANEL: CPT

## 2024-12-24 PROCEDURE — 80061 LIPID PANEL: CPT

## 2024-12-24 PROCEDURE — A9270 NON-COVERED ITEM OR SERVICE: HCPCS | Performed by: HOSPITALIST

## 2024-12-24 PROCEDURE — 83735 ASSAY OF MAGNESIUM: CPT

## 2024-12-24 PROCEDURE — 99239 HOSP IP/OBS DSCHRG MGMT >30: CPT | Performed by: INTERNAL MEDICINE

## 2024-12-24 PROCEDURE — A9502 TC99M TETROFOSMIN: HCPCS

## 2024-12-24 PROCEDURE — 85027 COMPLETE CBC AUTOMATED: CPT

## 2024-12-24 PROCEDURE — 78452 HT MUSCLE IMAGE SPECT MULT: CPT | Mod: 26 | Performed by: STUDENT IN AN ORGANIZED HEALTH CARE EDUCATION/TRAINING PROGRAM

## 2024-12-24 PROCEDURE — 93306 TTE W/DOPPLER COMPLETE: CPT

## 2024-12-24 PROCEDURE — 36415 COLL VENOUS BLD VENIPUNCTURE: CPT

## 2024-12-24 PROCEDURE — 700111 HCHG RX REV CODE 636 W/ 250 OVERRIDE (IP): Performed by: HOSPITALIST

## 2024-12-24 PROCEDURE — G0378 HOSPITAL OBSERVATION PER HR: HCPCS

## 2024-12-24 PROCEDURE — 93306 TTE W/DOPPLER COMPLETE: CPT | Mod: 26 | Performed by: STUDENT IN AN ORGANIZED HEALTH CARE EDUCATION/TRAINING PROGRAM

## 2024-12-24 PROCEDURE — 93018 CV STRESS TEST I&R ONLY: CPT | Performed by: STUDENT IN AN ORGANIZED HEALTH CARE EDUCATION/TRAINING PROGRAM

## 2024-12-24 RX ORDER — REGADENOSON 0.08 MG/ML
0.4 INJECTION, SOLUTION INTRAVENOUS ONCE
Status: COMPLETED | OUTPATIENT
Start: 2024-12-24 | End: 2024-12-24

## 2024-12-24 RX ORDER — AMINOPHYLLINE 25 MG/ML
100 INJECTION, SOLUTION INTRAVENOUS
Status: DISCONTINUED | OUTPATIENT
Start: 2024-12-24 | End: 2024-12-24 | Stop reason: HOSPADM

## 2024-12-24 RX ORDER — ATORVASTATIN CALCIUM 10 MG/1
10 TABLET, FILM COATED ORAL NIGHTLY
Qty: 30 TABLET | Refills: 0 | Status: SHIPPED | OUTPATIENT
Start: 2024-12-24

## 2024-12-24 RX ADMIN — ASPIRIN 81 MG: 81 TABLET, COATED ORAL at 05:11

## 2024-12-24 RX ADMIN — REGADENOSON 0.4 MG: 0.08 INJECTION, SOLUTION INTRAVENOUS at 10:01

## 2024-12-24 RX ADMIN — LEVOTHYROXINE SODIUM 100 MCG: 0.1 TABLET ORAL at 05:11

## 2024-12-24 ASSESSMENT — FIBROSIS 4 INDEX: FIB4 SCORE: 1.29

## 2024-12-24 ASSESSMENT — COGNITIVE AND FUNCTIONAL STATUS - GENERAL
CLIMB 3 TO 5 STEPS WITH RAILING: A LITTLE
SUGGESTED CMS G CODE MODIFIER MOBILITY: CI
SUGGESTED CMS G CODE MODIFIER DAILY ACTIVITY: CH
MOBILITY SCORE: 23
DAILY ACTIVITIY SCORE: 24

## 2024-12-24 ASSESSMENT — PAIN DESCRIPTION - PAIN TYPE: TYPE: ACUTE PAIN

## 2024-12-24 NOTE — H&P
Hospital Medicine History & Physical Note    Date of Service  12/23/2024    Primary Care Physician  Didi Bar M.D. (Inactive)    Consultants  None     Code Status  Full Code    Chief Complaint  Chief Complaint   Patient presents with    Chest Pain     Left-sided, sharp and continuous.   States he took NTG SL just prior to arrival, endorses improved CP. This was prescribed to him by his [facility] doctor.      History of Presenting Illness  Cesar Bernard is a 69 y.o. male with a past medical history of hypothyroidism and hyperlipidemia who presented 12/23/2024 with chest pain.  Pain started yesterday.  The pain is located posterior sternal does radiate to the left side of the chest.  It has a sharp stinging sensation.  Patient does not have associated shortness of breath nausea or vomiting.  There is no lower extremity pain redness or swelling.     I discussed the plan of care with emergency physician, and the patient    Review of Systems  Review of Systems   Constitutional:  Negative for chills and fever.   Eyes:  Negative for discharge and redness.   Respiratory:  Negative for cough, shortness of breath and stridor.    Cardiovascular:  Positive for chest pain. Negative for leg swelling.   Gastrointestinal:  Negative for abdominal pain and vomiting.   Genitourinary:  Negative for flank pain.   Musculoskeletal:  Negative for myalgias.   Skin: Negative.    Neurological:  Negative for focal weakness.   Endo/Heme/Allergies:  Does not bruise/bleed easily.   Psychiatric/Behavioral:  The patient is not nervous/anxious.      Past Medical History   has a past medical history of Allergy, Asthma, Hearing loss, Hyperlipidemia, Thyroid disease, and tongue cancer (2016).    Surgical History   has a past surgical history that includes hernia repair; laminotomy (04/13/1997); tonsillectomy and adenoidectomy; dupuytren contracture release; and other.     Family History  family history includes Alcohol/Drug in his brother; Cancer  "in his father, maternal aunt, paternal grandfather, and paternal uncle; Colon Cancer in his paternal uncle; Hyperlipidemia in his mother; Psychiatric Illness in his mother.      Social History   reports that he has never smoked. He has quit using smokeless tobacco.  His smokeless tobacco use included chew. He reports that he does not currently use alcohol after a past usage of about 1.2 oz of alcohol per week. He reports current drug use. Drug: Oral.    Allergies  Allergies   Allergen Reactions    Bee Venom Itching, Rash and Shortness of Breath    Mangifera Indica Anaphylaxis     \"deathly allergic\" per pt  Other reaction(s): Other (Specify with Comments)  \"deathly allergic\" per pt    Pistachio Nut Extract Skin Test Anaphylaxis    Extract Of Poison Ivy Itching and Rash    Other Misc Rash     Tumble Weed; itchy     Medications  Prior to Admission Medications   Prescriptions Last Dose Informant Patient Reported? Taking?   SYNTHROID 75 MCG Tab   Yes No   Sig: Take 75 mcg by mouth Every morning on an empty stomach.   Ubiquinol 300 MG Cap   Yes No   Sig: Take  by mouth every day.   ZYCLARA PUMP 3.75 % Cream   Yes No   aspirin EC (ECOTRIN) 81 MG Tablet Delayed Response   Yes No   Sig: Take 81 mg by mouth every day.   ezetimibe (ZETIA) 10 MG Tab   Yes No   Sig: Take 10 mg by mouth every day.   rosuvastatin (CRESTOR) 10 MG Tab   Yes No   Sig: Take 0.5 Tabs by mouth every evening.      Facility-Administered Medications: None     Physical Exam  Temp:  [36.5 °C (97.7 °F)-36.7 °C (98 °F)] 36.5 °C (97.7 °F)  Pulse:  [67-77] 70  Resp:  [14-16] 14  BP: ()/(58-95) 99/58  SpO2:  [95 %-97 %] 95 %  Blood Pressure : (!) 174/95   Temperature: 36.7 °C (98 °F)   Pulse: 77   Respiration: 16   Pulse Oximetry: 97 %     Physical Exam  Constitutional:       General: He is not in acute distress.     Appearance: He is not ill-appearing or diaphoretic.   HENT:      Head: Atraumatic.      Right Ear: External ear normal.      Left Ear: " "External ear normal.      Nose: No congestion or rhinorrhea.      Mouth/Throat:      Mouth: Mucous membranes are moist.   Eyes:      General: No scleral icterus.        Right eye: No discharge.         Left eye: No discharge.      Pupils: Pupils are equal, round, and reactive to light.   Cardiovascular:      Rate and Rhythm: Normal rate and regular rhythm.   Pulmonary:      Effort: Pulmonary effort is normal.      Comments: Saturating well on room air.  Is able to speak in full sentences  Abdominal:      General: There is no distension.   Musculoskeletal:      Cervical back: Neck supple. No rigidity. No muscular tenderness.      Right lower leg: No edema.      Left lower leg: No edema.   Skin:     Coloration: Skin is not jaundiced or pale.   Neurological:      Mental Status: He is alert and oriented to person, place, and time.      Coordination: Coordination normal.   Psychiatric:         Mood and Affect: Mood normal.         Behavior: Behavior normal.       Laboratory:  Recent Labs     12/23/24  1703   WBC 4.9   RBC 5.20   HEMOGLOBIN 16.6   HEMATOCRIT 48.6   MCV 93.5   MCH 31.9   MCHC 34.2   RDW 44.8   PLATELETCT 207   MPV 9.9     Recent Labs     12/23/24  1703   SODIUM 141   POTASSIUM 4.0   CHLORIDE 105   CO2 24   GLUCOSE 92   BUN 13   CREATININE 0.94   CALCIUM 9.4     Recent Labs     12/23/24  1703   ALTSGPT 17   ASTSGOT 17   ALKPHOSPHAT 44   TBILIRUBIN 0.4   GLUCOSE 92         No results for input(s): \"NTPROBNP\" in the last 72 hours.      Recent Labs     12/23/24  1703   TROPONINT 18     Imaging:  DX-CHEST-PORTABLE (1 VIEW)   Final Result      No acute cardiac or pulmonary abnormalities are identified.      EC-ECHOCARDIOGRAM COMPLETE W/O CONT    (Results Pending)   NM-CARDIAC STRESS TEST    (Results Pending)     I personally reviewed patient EKG shows sinus rhythm with a rate of 71.  There is left anterior fascicular block.  There is flattening of T waves in lead aVL.  There is ST depressions in leads V4-V6.  " QTc is 446.     Assessment/Plan:  Justification for Admission Status  I anticipate this patient is appropriate for observation status at this time because patient has chest pain.  Will require stress test and echocardiography.    Patient will need a Telemetry bed on MEDICAL service     * Chest pain- (present on admission)  Assessment & Plan  EKG shows sinus rhythm with a rate of 71.  There is left anterior fascicular block.  There is flattening of T waves in lead aVL.  There is ST depressions in leads V4-V6.  QTc is 446.   Initial troponin is within normal limits, I will trend  I ordered Stat EKG and troponin for recurrence of chest pain.   I will place on continuous cardiac monitoring.   Plan for stress test in the morning [ordered] as long as there are no significant EKG changes or significant troponin elevation     Elevated blood pressure reading- (present on admission)  Assessment & Plan  No known history of primary hypertension  Likely secondary to anxiety and chest pain  Anxiolytic as needed  Multimodal pain control  I will start as needed labetalol for extreme hypertension  Consider starting scheduled antihypertensive medications according to blood pressure trend    Abnormal EKG- (present on admission)  Assessment & Plan  EKG shows sinus rhythm with a rate of 71.  There is left anterior fascicular block.  There is flattening of T waves in lead aVL.  There is ST depressions in leads V4-V6.  QTc is 446.     I will place on continuous cardiac monitoring  I will check and trend troponins  I will check echocardiography       Anxiety- (present on admission)  Assessment & Plan  I will resume home alprazolam    Acquired hypothyroidism- (present on admission)  Assessment & Plan  I resume home levothyroxine     Mixed hyperlipidemia- (present on admission)  Assessment & Plan  Cardiac diet      VTE prophylaxis: SCDs/TEDs and enoxaparin ppx

## 2024-12-24 NOTE — PROGRESS NOTES
Discharge paperwork reviewed with pt and copy provided. IV and telebox removed. Pt ambulated off unit with all belongings and Meds 2 beds as well as home meds from pharmacy.

## 2024-12-24 NOTE — PROGRESS NOTES
4 Eyes Skin Assessment Completed by KEVIN Barraza and KEVIN Church.    Head WDL  Ears WDL  Nose WDL  Mouth WDL  Neck WDL  Breast/Chest WDL  Shoulder Blades WDL  Spine WDL  (R) Arm/Elbow/Hand WDL  (L) Arm/Elbow/Hand WDL  Abdomen WDL  Groin WDL  Scrotum/Coccyx/Buttocks WDL  (R) Leg WDL  (L) Leg WDL  (R) Heel/Foot/Toe WDL  (L) Heel/Foot/Toe WDL          Devices In Places Tele Box and Pulse Ox      Interventions In Place N/A    Possible Skin Injury No    Pictures Uploaded Into Epic N/A  Wound Consult Placed N/A  RN Wound Prevention Protocol Ordered No

## 2024-12-24 NOTE — CARE PLAN
The patient is Stable - Low risk of patient condition declining or worsening    Shift Goals  Clinical Goals: stress test, echo, monitor pain  Patient Goals: procedures    Progress made toward(s) clinical / shift goals:    Problem: Knowledge Deficit - Standard  Goal: Patient and family/care givers will demonstrate understanding of plan of care, disease process/condition, diagnostic tests and medications  Description: Target End Date:  1-3 days or as soon as patient condition allows    Document in Patient Education    1.  Patient and family/caregiver oriented to unit, equipment, visitation policy and means for communicating concern  2.  Complete/review Learning Assessment  3.  Assess knowledge level of disease process/condition, treatment plan, diagnostic tests and medications  4.  Explain disease process/condition, treatment plan, diagnostic tests and medications  Outcome: Progressing  Note: Educated on medication and procedures       Patient is not progressing towards the following goals:

## 2024-12-24 NOTE — ASSESSMENT & PLAN NOTE
EKG shows sinus rhythm with a rate of 71.  There is left anterior fascicular block.  There is flattening of T waves in lead aVL.  There is ST depressions in leads V4-V6.  QTc is 446.   Initial troponin is within normal limits, I will trend  I ordered Stat EKG and troponin for recurrence of chest pain.   I will place on continuous cardiac monitoring.   Plan for stress test in the morning [ordered] as long as there are no significant EKG changes or significant troponin elevation

## 2024-12-24 NOTE — ASSESSMENT & PLAN NOTE
EKG shows sinus rhythm with a rate of 71.  There is left anterior fascicular block.  There is flattening of T waves in lead aVL.  There is ST depressions in leads V4-V6.  QTc is 446.     I will place on continuous cardiac monitoring  I will check and trend troponins  I will check echocardiography

## 2024-12-24 NOTE — ASSESSMENT & PLAN NOTE
No known history of primary hypertension  Likely secondary to anxiety and chest pain  Anxiolytic as needed  Multimodal pain control  I will start as needed labetalol for extreme hypertension  Consider starting scheduled antihypertensive medications according to blood pressure trend

## 2024-12-24 NOTE — ED PROVIDER NOTES
ER Provider Note    Scribed for Jay Markham D.O. by Sharda Augustin. 12/23/2024  5:01 PM    Primary Care Provider: Didi Bar M.D. (Inactive)    CHIEF COMPLAINT  Chief Complaint   Patient presents with    Chest Pain     Left-sided, sharp and continuous.   States he took NTG SL just prior to arrival, endorses improved CP. This was prescribed to him by his  doctor.      HPI/ROS    Cesar Bernard is a 69 y.o. male who presents to the Emergency Department for left sided chest pain onset yesterday. Patient notes his pain radiates his left chest describing it as a stinging like sensation. He notes in the past when he is playing golf, feeling a tinge. However, yesterday and today this episode occurred while he was driving. Today's episode of pain occurred at 12pm and lasted a few hours. He denies walking does not exacerbate his pain. Denies shortness of breath, nausea, diaphoresis. Patient reports he had an angiogram done in 2020. Patient denies history of heart disease, hypertension or diabetes. He reports he has a  doctor which prescribed him nitroglycerine which he took and this improved his chest pain. He has a history of hyperlipidemia, does not take any medication for this.     ROS as per HPI.    PAST MEDICAL HISTORY  Past Medical History:   Diagnosis Date    Allergy     Asthma     Hearing loss     Hyperlipidemia     Thyroid disease     tongue cancer 2016    radiation, cetux      SURGICAL HISTORY  Past Surgical History:   Procedure Laterality Date    LAMINOTOMY  04/13/1997    L4-L5    DUPUYTREN CONTRACTURE RELEASE      HERNIA REPAIR      ventral    OTHER      Salivary Gland Transfer    TONSILLECTOMY AND ADENOIDECTOMY       FAMILY HISTORY  Family History   Problem Relation Age of Onset    Psychiatric Illness Mother     Hyperlipidemia Mother     Cancer Father         Prostate cancer     Cancer Paternal Grandfather         Prostate cancer     Cancer Maternal Aunt         Ovarian cancer    Colon  Cancer Paternal Uncle         At age 50s    Cancer Paternal Uncle     Alcohol/Drug Brother     Heart Disease Neg Hx      SOCIAL HISTORY   reports that he has never smoked. He has quit using smokeless tobacco.  His smokeless tobacco use included chew. He reports that he does not currently use alcohol after a past usage of about 1.2 oz of alcohol per week. He reports current drug use. Drug: Oral.    CURRENT MEDICATIONS  Current Discharge Medication List        CONTINUE these medications which have NOT CHANGED    Details   levothyroxine (SYNTHROID) 100 MCG Tab Take 100 mcg by mouth every morning on an empty stomach.      ALPRAZolam (XANAX) 1 MG Tab Take 0.5 mg by mouth as needed for Anxiety.      ASCORBIC ACID PO Take  by mouth every day.      Multiple Vitamins-Minerals (ZINC PO) Take  by mouth every day.      MAGNESIUM PO Take  by mouth every day.      aspirin EC (ECOTRIN) 81 MG Tablet Delayed Response Take 81 mg by mouth every day.      ZYCLARA PUMP 3.75 % Cream Refills: 4           ALLERGIES  Bee venom, Mangifera indica, Pistachio nut extract skin test, Extract of poison ivy, and Other misc    PHYSICAL EXAM  BP (!) 174/95   Pulse 77   Temp 36.7 °C (98 °F) (Temporal)   Resp 16   Ht 1.829 m (6')   Wt 93.2 kg (205 lb 7.5 oz)   SpO2 97%   BMI 27.87 kg/m²     General: No acute distress.  HENT: Normocephalic, Mucus membranes are moist.   Chest: Lungs have even and unlabored respirations, Clear to auscultation.   Cardiovascular: Regular rate and regular rhythm, No peripheral cyanosis.  Abdomen: Non distended.  Neuro: Awake, Conversive, Able to relay recent events.  Psychiatric: Calm and cooperative.     INITIAL ASSESSMENT  Patient has a history of high cholesterol and is not taking medication for this. He experience an episode of chest pain today and yesterday both while driving. He has been walking dogs and working out without any chest pain. Will evaluate for any cardiac injury.     ED Observation Status? No;  Patient does not meet criteria for ED Observation.     DIAGNOSTIC STUDIES  Labs:   Results for orders placed or performed during the hospital encounter of 24   EKG    Collection Time: 24  2:38 PM   Result Value Ref Range    Report       [facility] Emergency Dept.    Test Date:  2024  Pt Name:    JOSE JUAN MEDINA                 Department: [facility]  MRN:        [id number]                      Room:  Gender:     Male                         Technician: [id number]  :        1955                   Requested By:ER TRIAGE PROTOCOL  Order #:    [id number]                    Reading MD: Alexis Markham, DO    Measurements  Intervals                                Axis  Rate:       71                           P:          64  DE:         164                          QRS:        -53  QRSD:       112                          T:          55  QT:         410  QTc:        446    Interpretive Statements  Sinus rhythm  ST segment depression Lead 4, 5, 6   Left anterior fascicular block  No previous ECG available for comparison     CBC WITH DIFFERENTIAL    Collection Time: 24  5:03 PM   Result Value Ref Range    WBC 4.9 4.8 - 10.8 K/uL    RBC 5.20 4.70 - 6.10 M/uL    Hemoglobin 16.6 14.0 - 18.0 g/dL    Hematocrit 48.6 42.0 - 52.0 %    MCV 93.5 81.4 - 97.8 fL    MCH 31.9 27.0 - 33.0 pg    MCHC 34.2 32.3 - 36.5 g/dL    RDW 44.8 35.9 - 50.0 fL    Platelet Count 207 164 - 446 K/uL    MPV 9.9 9.0 - 12.9 fL    Neutrophils-Polys 71.00 44.00 - 72.00 %    Lymphocytes 18.50 (L) 22.00 - 41.00 %    Monocytes 6.70 0.00 - 13.40 %    Eosinophils 2.60 0.00 - 6.90 %    Basophils 1.00 0.00 - 1.80 %    Immature Granulocytes 0.20 0.00 - 0.90 %    Nucleated RBC 0.00 0.00 - 0.20 /100 WBC    Neutrophils (Absolute) 3.50 1.82 - 7.42 K/uL    Lymphs (Absolute) 0.91 (L) 1.00 - 4.80 K/uL    Monos (Absolute) 0.33 0.00 - 0.85 K/uL    Eos (Absolute) 0.13 0.00 - 0.51 K/uL    Baso (Absolute) 0.05 0.00 - 0.12 K/uL    Immature  Granulocytes (abs) 0.01 0.00 - 0.11 K/uL    NRBC (Absolute) 0.00 K/uL   COMP METABOLIC PANEL    Collection Time: 12/23/24  5:03 PM   Result Value Ref Range    Sodium 141 135 - 145 mmol/L    Potassium 4.0 3.6 - 5.5 mmol/L    Chloride 105 96 - 112 mmol/L    Co2 24 20 - 33 mmol/L    Anion Gap 12.0 7.0 - 16.0    Glucose 92 65 - 99 mg/dL    Bun 13 8 - 22 mg/dL    Creatinine 0.94 0.50 - 1.40 mg/dL    Calcium 9.4 8.4 - 10.2 mg/dL    Correct Calcium 9.0 8.5 - 10.5 mg/dL    AST(SGOT) 17 12 - 45 U/L    ALT(SGPT) 17 2 - 50 U/L    Alkaline Phosphatase 44 30 - 99 U/L    Total Bilirubin 0.4 0.1 - 1.5 mg/dL    Albumin 4.5 3.2 - 4.9 g/dL    Total Protein 6.8 6.0 - 8.2 g/dL    Globulin 2.3 1.9 - 3.5 g/dL    A-G Ratio 2.0 g/dL   TROPONIN    Collection Time: 12/23/24  5:03 PM   Result Value Ref Range    Troponin T 18 6 - 19 ng/L   ESTIMATED GFR    Collection Time: 12/23/24  5:03 PM   Result Value Ref Range    GFR (CKD-EPI) 89 >60 mL/min/1.73 m 2     EKG:   I have independently interpreted the above EKG.    Radiology:   The attending emergency physician has independently interpreted the diagnostic imaging associated with this visit and am waiting the final reading from the radiologist.   Preliminary interpretation is as follows: No acute abnormalities    Radiologist interpretation:   DX-CHEST-PORTABLE (1 VIEW)   Final Result      No acute cardiac or pulmonary abnormalities are identified.      EC-ECHOCARDIOGRAM COMPLETE W/O CONT    (Results Pending)   NM-CARDIAC STRESS TEST    (Results Pending)     COURSE & MEDICAL DECISION MAKING     COURSE AND PLAN  5:07 PM - Patient was seen and evaluated at bedside. Patient presents to the ED for chest pain.  After my exam, I discussed with the patient the plan of care, which obtaining lab work and imaging for further evaluation. Patient understands and verbalizes agreement to plan of care. Ordered DX chest, EKG, CBC w diff, CMP and troponin  to evaluate.     6:08 PM - Patient was reevaluated at  bedside. Patient HEART Score 5. Discussed with patient plan for admission for further cardiac workup. Patient verbalizes understanding and agreement to this plan of care.     6:31 PM - Hospitalist responded. I discussed the patient's case and the above findings with Dr. Vincent (Hospitalist) who agrees to evaluate the patient for hospitalization.     ED Summary: Patient presented with left-sided chest pain.  The pain pattern was not concerning for cardiac disease however his EKG shows ST depressions.  There is no old comparison.  His troponin is negative his heart score is elevated so the patient will be admitted for further evaluation and treatment.  I did speak with the patient and with the patient's wife on the phone so she is aware of the plan.    Decision tools and prescription drugs considered including, but not limited to: HEART Score 5    DISPOSITION AND DISCUSSIONS  I have discussed management of the patient with the following physicians and JULIET's: Dr. Vincent (Hospitalist)    DISPOSITION:  Patient will be hospitalized by Dr. Vincent in guarded condition.    FINAL DIAGNOSIS  1. Precordial chest pain    2. Abnormal EKG        Sharda HENRY (Magdalena), am scribing for, and in the presence of, Jay Markham D.O..    Electronically signed by: Sharda Augustin (Magdalena), 12/23/2024    IJay D.O. personally performed the services described in this documentation, as scribed by Sharda Augustin in my presence, and it is both accurate and complete.     The note accurately reflects work and decisions made by me.  Jay Markham D.O.  12/23/2024  11:16 PM

## 2024-12-24 NOTE — ED NOTES
Medication history reviewed with patient. Med rec is complete.   Allergies reviewed.   Patient denies any outpatient antibiotics in the last 30 days.   Anticoagulants taken in the last 14 days? No       Yoan Alcaraz PharmD, BCPS

## 2024-12-25 NOTE — DISCHARGE SUMMARY
Discharge Summary    CHIEF COMPLAINT ON ADMISSION  Chief Complaint   Patient presents with    Chest Pain     Left-sided, sharp and continuous.   States he took NTG SL just prior to arrival, endorses improved CP. This was prescribed to him by his  doctor.        Reason for Admission  Chest Pains     Admission Date  12/23/2024    CODE STATUS  Prior    HPI & HOSPITAL COURSE  This is a 69 y.o. male here with 2 episodes of chest pain, the chest pain initiates in the precordium and radiates upward on his left side toward his shoulder more midclavicular however than outer shoulder.  He was prescribed nitroglycerin and when he took a nitroglycerin it did alleviate the chest pain by the time he arrived at the hospital.  He also noted that a cold drink of water seem to cause the chest pain to subside as well.  He describes the pain as sharp but does not have exacerbation with deep breathing.  At the time of his discharge patient was no longer having chest pain.  Review of his previous studies showed some elevated coronary calcium scores and his lipid profile shows an LDL of 122.  I discussed dyslipidemia that cannot be controlled with diet or exercise and recommend low-dose statin at this time.  Subsequent echo and MPI are unremarkable.  This sounds like esophageal spasm given that he has relief with cold water as well as nitroglycerin.  I have advised him to try Maalox next time it occurs and if he has relief with this this strongly suggest that this is esophageal and would require further evaluation with EGD.  No notes on file    Therefore, he is discharged in good and stable condition to home with close outpatient follow-up.      Discharge Date  12/24/2024    FOLLOW UP ITEMS POST DISCHARGE  Primary care    DISCHARGE DIAGNOSES  Principal Problem:    Chest pain (POA: Yes)  Active Problems:    Mixed hyperlipidemia (POA: Yes)    Acquired hypothyroidism (POA: Yes)    Anxiety (POA: Yes)    Abnormal EKG (POA: Yes)     "Elevated blood pressure reading (POA: Yes)  Resolved Problems:    * No resolved hospital problems. *      FOLLOW UP  Future Appointments   Date Time Provider Department Center   1/29/2025  8:40 AM DENISE Metz None     No follow-up provider specified.    MEDICATIONS ON DISCHARGE     Medication List        START taking these medications        Instructions   atorvastatin 10 MG Tabs  Commonly known as: Lipitor   Take 1 Tablet by mouth every evening.  Dose: 10 mg            CONTINUE taking these medications        Instructions   ALPRAZolam 1 MG Tabs  Commonly known as: Xanax   Take 0.5 mg by mouth as needed for Anxiety.  Dose: 0.5 mg     ASCORBIC ACID PO   Take  by mouth every day.     aspirin EC 81 MG Tbec  Commonly known as: Ecotrin   Take 81 mg by mouth every day.  Dose: 81 mg     levothyroxine 100 MCG Tabs  Commonly known as: Synthroid   Take 100 mcg by mouth every morning on an empty stomach.  Dose: 100 mcg     MAGNESIUM PO   Take  by mouth every day.     ZINC PO   Take  by mouth every day.     Zyclara Pump 3.75 % Crea  Generic drug: Imiquimod               Allergies  Allergies   Allergen Reactions    Bee Venom Itching, Rash and Shortness of Breath    Mangifera Indica Anaphylaxis     \"deathly allergic\" per pt  Other reaction(s): Other (Specify with Comments)  \"deathly allergic\" per pt    Pistachio Nut Extract Skin Test Anaphylaxis    Bloodless     Extract Of Poison Ivy Itching and Rash    Other Misc Rash     Tumble Weed; itchy       DIET  No orders of the defined types were placed in this encounter.      ACTIVITY  As tolerated.    CONSULTATIONS  None    PROCEDURES  None    LABORATORY  Lab Results   Component Value Date    SODIUM 142 12/24/2024    POTASSIUM 3.6 12/24/2024    CHLORIDE 109 12/24/2024    CO2 22 12/24/2024    GLUCOSE 99 12/24/2024    BUN 14 12/24/2024    CREATININE 0.85 12/24/2024        Lab Results   Component Value Date    WBC 3.5 (L) 12/24/2024    HEMOGLOBIN 15.4 12/24/2024    " HEMATOCRIT 45.4 12/24/2024    PLATELETCT 180 12/24/2024        Total time of the discharge process exceeds 36 minutes.

## 2025-01-29 ENCOUNTER — APPOINTMENT (OUTPATIENT)
Dept: CARDIOLOGY | Facility: MEDICAL CENTER | Age: 70
End: 2025-01-29
Attending: INTERNAL MEDICINE
Payer: MEDICARE